# Patient Record
Sex: FEMALE | Race: WHITE | NOT HISPANIC OR LATINO | Employment: FULL TIME | URBAN - METROPOLITAN AREA
[De-identification: names, ages, dates, MRNs, and addresses within clinical notes are randomized per-mention and may not be internally consistent; named-entity substitution may affect disease eponyms.]

---

## 2018-07-06 ENCOUNTER — OFFICE VISIT (OUTPATIENT)
Dept: URGENT CARE | Facility: CLINIC | Age: 23
End: 2018-07-06
Payer: COMMERCIAL

## 2018-07-06 ENCOUNTER — HOSPITAL ENCOUNTER (OUTPATIENT)
Dept: RADIOLOGY | Facility: MEDICAL CENTER | Age: 23
End: 2018-07-06
Attending: FAMILY MEDICINE
Payer: COMMERCIAL

## 2018-07-06 VITALS
TEMPERATURE: 99.4 F | WEIGHT: 167 LBS | OXYGEN SATURATION: 100 % | SYSTOLIC BLOOD PRESSURE: 132 MMHG | RESPIRATION RATE: 16 BRPM | DIASTOLIC BLOOD PRESSURE: 62 MMHG | HEART RATE: 97 BPM | BODY MASS INDEX: 24.73 KG/M2 | HEIGHT: 69 IN

## 2018-07-06 DIAGNOSIS — R10.2 PELVIC PAIN IN PREGNANCY: ICD-10-CM

## 2018-07-06 DIAGNOSIS — O26.899 PELVIC PAIN IN PREGNANCY: ICD-10-CM

## 2018-07-06 LAB
INT CON NEG: ABNORMAL
INT CON POS: ABNORMAL
POC URINE PREGNANCY TEST: ABNORMAL

## 2018-07-06 PROCEDURE — 81025 URINE PREGNANCY TEST: CPT | Performed by: FAMILY MEDICINE

## 2018-07-06 PROCEDURE — 76817 TRANSVAGINAL US OBSTETRIC: CPT

## 2018-07-06 PROCEDURE — 99203 OFFICE O/P NEW LOW 30 MIN: CPT | Performed by: FAMILY MEDICINE

## 2018-07-08 ASSESSMENT — ENCOUNTER SYMPTOMS
FLANK PAIN: 0
NAUSEA: 1
WEIGHT LOSS: 0
FEVER: 0

## 2018-07-09 NOTE — PROGRESS NOTES
"Subjective:      Jenni Perez is a 22 y.o. female who presents with Abdominal Cramping (x2days pt is 8wks pregnant )            2 days waxing and waning pelvic cramping. At times severe. No bleeding/spotting. No urinary sx's. No fever. 8wk pregnant. No change in bowel. No dysuria. No other aggravating or alleviating factors.          Review of Systems   Constitutional: Negative for fever, malaise/fatigue and weight loss.   Gastrointestinal: Positive for nausea.   Genitourinary: Negative for flank pain and hematuria.   Skin: Negative for itching and rash.     .  Medications, Allergies, and current problem list reviewed today in Epic       Objective:     /62   Pulse 97   Temp 37.4 °C (99.4 °F)   Resp 16   Ht 1.753 m (5' 9\")   Wt 75.8 kg (167 lb)   LMP 05/11/2018   SpO2 100%   Breastfeeding? No   BMI 24.66 kg/m²      Physical Exam   Constitutional: She appears well-developed and well-nourished. No distress.   HENT:   Head: Normocephalic and atraumatic.   Eyes: Conjunctivae are normal.   Cardiovascular: Normal rate, regular rhythm and normal heart sounds.    Pulmonary/Chest: Effort normal and breath sounds normal. She has no wheezes.   Abdominal: Soft. There is no tenderness.   Genitourinary:   Genitourinary Comments: No suprapubic or CVAT   Skin: Skin is warm and dry. No rash noted.               Assessment/Plan:   US per radiology:  Probable early intrauterine gestation with a borderline intrauterine decidual sign detected in the anterior fundus. A mean diameter of 3 mm however should be present to definitively characterize. Therefore ectopic pregnancy still cannot be excluded and   clinical correlation is recommended    Small free pelvic fluid is identified in the right adnexa with normal appearance of bilateral ovaries and no abnormal adnexal mass detected    1. Pelvic pain in pregnancy  - US-OB PELVIS TRANSVAGINAL; Future  - POCT PREGNANCY    Low suspicion for ectopic pregnancy at this time. To ER " with persistent or worse pain o/w f/u with OB.

## 2018-10-12 ENCOUNTER — HOSPITAL ENCOUNTER (OUTPATIENT)
Facility: MEDICAL CENTER | Age: 23
End: 2018-10-12
Attending: PHYSICIAN ASSISTANT
Payer: COMMERCIAL

## 2018-10-12 ENCOUNTER — OFFICE VISIT (OUTPATIENT)
Dept: URGENT CARE | Facility: PHYSICIAN GROUP | Age: 23
End: 2018-10-12
Payer: COMMERCIAL

## 2018-10-12 VITALS
HEART RATE: 72 BPM | OXYGEN SATURATION: 100 % | RESPIRATION RATE: 14 BRPM | TEMPERATURE: 98.5 F | DIASTOLIC BLOOD PRESSURE: 72 MMHG | SYSTOLIC BLOOD PRESSURE: 120 MMHG

## 2018-10-12 DIAGNOSIS — N30.01 ACUTE CYSTITIS WITH HEMATURIA: ICD-10-CM

## 2018-10-12 LAB
APPEARANCE UR: NORMAL
BILIRUB UR STRIP-MCNC: NORMAL MG/DL
COLOR UR AUTO: YELLOW
GLUCOSE UR STRIP.AUTO-MCNC: NORMAL MG/DL
KETONES UR STRIP.AUTO-MCNC: NORMAL MG/DL
LEUKOCYTE ESTERASE UR QL STRIP.AUTO: NORMAL
NITRITE UR QL STRIP.AUTO: NORMAL
PH UR STRIP.AUTO: 6.5 [PH] (ref 5–8)
PROT UR QL STRIP: NORMAL MG/DL
RBC UR QL AUTO: NORMAL
SP GR UR STRIP.AUTO: 1.02
UROBILINOGEN UR STRIP-MCNC: 0.2 MG/DL

## 2018-10-12 PROCEDURE — 87086 URINE CULTURE/COLONY COUNT: CPT

## 2018-10-12 PROCEDURE — 81002 URINALYSIS NONAUTO W/O SCOPE: CPT | Performed by: PHYSICIAN ASSISTANT

## 2018-10-12 PROCEDURE — 99214 OFFICE O/P EST MOD 30 MIN: CPT | Performed by: PHYSICIAN ASSISTANT

## 2018-10-12 RX ORDER — AMOXICILLIN AND CLAVULANATE POTASSIUM 875; 125 MG/1; MG/1
1 TABLET, FILM COATED ORAL 2 TIMES DAILY
Qty: 10 TAB | Refills: 0 | Status: SHIPPED | OUTPATIENT
Start: 2018-10-12 | End: 2018-10-17

## 2018-10-13 DIAGNOSIS — N30.01 ACUTE CYSTITIS WITH HEMATURIA: ICD-10-CM

## 2018-10-13 NOTE — PROGRESS NOTES
Chief Complaint   Patient presents with   • Abdominal Pain     poss round ligemint pain. 18 wks pregnant.       HISTORY OF PRESENT ILLNESS: Patient is a 23 y.o. female who presents today for about 2 days of bilateral however more often left abdominal discomfort that can radiate up abdomen and down her leg.  She felt that it was possible round ligament pain but wanted to make sure.  She states that she has had slight nausea today but no vomiting.  She has not had flank pain, fevers or chills.  She denies hematuria or spotting.  She has had slight increased frequency and urgency today but no dysuria.  No increase or change in vaginal discharge.   No attempted interventions.   Pregnancy is her first and has been without complication.  Next appt is Monday.     There are no active problems to display for this patient.      Allergies:Patient has no known allergies.    Current Outpatient Prescriptions Ordered in [facility]   Medication Sig Dispense Refill   • amoxicillin-clavulanate (AUGMENTIN) 875-125 MG Tab Take 1 Tab by mouth 2 times a day for 5 days. 10 Tab 0   • QUETIAPINE FUMARATE PO Take  by mouth.       No current Epic-ordered facility-administered medications on file.        No past medical history on file.    Social History   Substance Use Topics   • Smoking status: Never Smoker   • Smokeless tobacco: Never Used   • Alcohol use No       No family status information on file.   No family history on file.    ROS:  Review of Systems   Constitutional: SEE HPI  HENT: Negative for ear pain, nosebleeds, congestion, sore throat and neck pain.    Eyes: Negative for blurred vision.   Respiratory: Negative for cough, sputum production, shortness of breath and wheezing.    Cardiovascular: Negative for chest pain, palpitations, orthopnea and leg swelling.   Gastrointestinal: SEE HPI  Genitourinary: SEE HPI    Exam:  Blood pressure 120/72, pulse 72, temperature 36.9 °C (98.5 °F), temperature source Temporal, resp. rate 14, last  menstrual period 05/11/2018, SpO2 100 %, not currently breastfeeding.  General:  Well nourished, well developed female in NAD  Eyes: PERRLA, EOM within normal limits, no conjunctival injection, no scleral icterus, visual fields and acuity grossly intact.  Mouth: reasonable hygiene, no erythema exudates or tonsillar enlargement.  Moist mucus membranes.   Neck: no masses, range of motion within normal limits, no tracheal deviation. No lymphadenopathy  Pulmonary: Normal respiratory effort, no wheezes, crackles, or rhonchi.  Cardiovascular: regular rate and rhythm without murmurs, rubs, or gallops.  Abdomen: Soft, nontender, nondistended. Normal bowel sounds. No hepatosplenomegaly or masses, or hernias. No rebound or guarding.  No CVA tenderness.  Fundus approx 2 inches below umbilicus.   Skin: No visible rashes or lesion. Warm, pink, dry.   Extremities: no clubbing, cyanosis, or edema.  Neuro: A&O x 3. Speech normal/clear.  Normal gait.     Component Results     Component Value Ref Range & Units Status   POC Color yellow  Negative Final   POC Appearance cloudy  Negative Final   POC Leukocyte Esterase mod  Negative Final   POC Nitrites neg  Negative Final   POC Urobiligen 0.2  Negative (0.2) mg/dL Final   POC Protein neg  Negative mg/dL Final   POC Urine PH 6.5  5.0 - 8.0 Final   POC Blood tr  Negative Final   POC Specific Gravity 1.020  <1.005 - >1.030 Final   POC Ketones neg  Negative mg/dL Final   POC Bilirubin neg  Negative mg/dL Final   POC Glucose neg  Negative mg/dL Final       Assessment/Plan:  1. Acute cystitis with hematuria  POCT Urinalysis    URINE CULTURE(NEW)    amoxicillin-clavulanate (AUGMENTIN) 875-125 MG Tab       -POCT U/A as above.  Culture sent  -patient with above findings, nausea/urinary frequency increase from baseline, will treat and follow with culture.   Vitals stable, no clinical concern for pyelonephritis at this time.   -Fluids emphasized.  Void before/after intercourse.  Recommend abstain  until treatment complete/symptoms resolved.   -signs and symptoms of worsening/ascending infection discussed and to seek prompt medical care should they arise.   -OB follow as sched on Monday      Supportive care, differential diagnoses, and indications for immediate follow-up discussed with patient.   Pathogenesis of diagnosis discussed including typical length and natural progression.   Instructed to return to clinic or nearest emergency department for any change in condition, further concerns, or worsening of symptoms.  Patient states understanding of the plan of care and discharge instructions.        Asha Maravilla P.A.-C.

## 2018-10-15 LAB
BACTERIA UR CULT: ABNORMAL
BACTERIA UR CULT: ABNORMAL
SIGNIFICANT IND 70042: ABNORMAL
SITE SITE: ABNORMAL
SOURCE SOURCE: ABNORMAL

## 2018-12-16 ENCOUNTER — APPOINTMENT (OUTPATIENT)
Dept: OTHER | Facility: IMAGING CENTER | Age: 23
End: 2018-12-16

## 2019-01-21 ENCOUNTER — APPOINTMENT (OUTPATIENT)
Dept: OTHER | Facility: IMAGING CENTER | Age: 24
End: 2019-01-21

## 2019-03-14 ENCOUNTER — APPOINTMENT (OUTPATIENT)
Dept: OBGYN | Facility: MEDICAL CENTER | Age: 24
End: 2019-03-14
Attending: OBSTETRICS & GYNECOLOGY
Payer: COMMERCIAL

## 2019-03-14 ENCOUNTER — ANESTHESIA (OUTPATIENT)
Dept: OBGYN | Facility: MEDICAL CENTER | Age: 24
End: 2019-03-14
Payer: COMMERCIAL

## 2019-03-14 ENCOUNTER — HOSPITAL ENCOUNTER (INPATIENT)
Facility: MEDICAL CENTER | Age: 24
LOS: 2 days | End: 2019-03-16
Attending: OBSTETRICS & GYNECOLOGY | Admitting: OBSTETRICS & GYNECOLOGY
Payer: COMMERCIAL

## 2019-03-14 ENCOUNTER — ANESTHESIA EVENT (OUTPATIENT)
Dept: OBGYN | Facility: MEDICAL CENTER | Age: 24
End: 2019-03-14
Payer: COMMERCIAL

## 2019-03-14 LAB
BASOPHILS # BLD AUTO: 0.4 % (ref 0–1.8)
BASOPHILS # BLD: 0.05 K/UL (ref 0–0.12)
EOSINOPHIL # BLD AUTO: 0.16 K/UL (ref 0–0.51)
EOSINOPHIL NFR BLD: 1.4 % (ref 0–6.9)
ERYTHROCYTE [DISTWIDTH] IN BLOOD BY AUTOMATED COUNT: 42.7 FL (ref 35.9–50)
HCT VFR BLD AUTO: 42.2 % (ref 37–47)
HGB BLD-MCNC: 13.9 G/DL (ref 12–16)
HOLDING TUBE BB 8507: NORMAL
IMM GRANULOCYTES # BLD AUTO: 0.1 K/UL (ref 0–0.11)
IMM GRANULOCYTES NFR BLD AUTO: 0.9 % (ref 0–0.9)
LYMPHOCYTES # BLD AUTO: 2.82 K/UL (ref 1–4.8)
LYMPHOCYTES NFR BLD: 24.4 % (ref 22–41)
MCH RBC QN AUTO: 30.2 PG (ref 27–33)
MCHC RBC AUTO-ENTMCNC: 32.9 G/DL (ref 33.6–35)
MCV RBC AUTO: 91.5 FL (ref 81.4–97.8)
MONOCYTES # BLD AUTO: 1.22 K/UL (ref 0–0.85)
MONOCYTES NFR BLD AUTO: 10.5 % (ref 0–13.4)
NEUTROPHILS # BLD AUTO: 7.23 K/UL (ref 2–7.15)
NEUTROPHILS NFR BLD: 62.4 % (ref 44–72)
NRBC # BLD AUTO: 0 K/UL
NRBC BLD-RTO: 0 /100 WBC
PLATELET # BLD AUTO: 230 K/UL (ref 164–446)
PMV BLD AUTO: 11.7 FL (ref 9–12.9)
RBC # BLD AUTO: 4.61 M/UL (ref 4.2–5.4)
WBC # BLD AUTO: 11.6 K/UL (ref 4.8–10.8)

## 2019-03-14 PROCEDURE — A9270 NON-COVERED ITEM OR SERVICE: HCPCS | Performed by: OBSTETRICS & GYNECOLOGY

## 2019-03-14 PROCEDURE — 3E0E37Z INTRODUCTION OF ELECTROLYTIC AND WATER BALANCE SUBSTANCE INTO PRODUCTS OF CONCEPTION, PERCUTANEOUS APPROACH: ICD-10-PCS | Performed by: OBSTETRICS & GYNECOLOGY

## 2019-03-14 PROCEDURE — 3E0P7VZ INTRODUCTION OF HORMONE INTO FEMALE REPRODUCTIVE, VIA NATURAL OR ARTIFICIAL OPENING: ICD-10-PCS | Performed by: OBSTETRICS & GYNECOLOGY

## 2019-03-14 PROCEDURE — 700111 HCHG RX REV CODE 636 W/ 250 OVERRIDE (IP): Performed by: OBSTETRICS & GYNECOLOGY

## 2019-03-14 PROCEDURE — 700105 HCHG RX REV CODE 258: Performed by: OBSTETRICS & GYNECOLOGY

## 2019-03-14 PROCEDURE — 302130 PCEA EPIDURAL PUMP: Performed by: OBSTETRICS & GYNECOLOGY

## 2019-03-14 PROCEDURE — 700111 HCHG RX REV CODE 636 W/ 250 OVERRIDE (IP)

## 2019-03-14 PROCEDURE — 770002 HCHG ROOM/CARE - OB PRIVATE (112)

## 2019-03-14 PROCEDURE — 700102 HCHG RX REV CODE 250 W/ 637 OVERRIDE(OP): Performed by: OBSTETRICS & GYNECOLOGY

## 2019-03-14 PROCEDURE — 10907ZC DRAINAGE OF AMNIOTIC FLUID, THERAPEUTIC FROM PRODUCTS OF CONCEPTION, VIA NATURAL OR ARTIFICIAL OPENING: ICD-10-PCS | Performed by: OBSTETRICS & GYNECOLOGY

## 2019-03-14 PROCEDURE — 700101 HCHG RX REV CODE 250: Performed by: ANESTHESIOLOGY

## 2019-03-14 PROCEDURE — 59409 OBSTETRICAL CARE: CPT

## 2019-03-14 PROCEDURE — 0UQGXZZ REPAIR VAGINA, EXTERNAL APPROACH: ICD-10-PCS | Performed by: OBSTETRICS & GYNECOLOGY

## 2019-03-14 PROCEDURE — 85025 COMPLETE CBC W/AUTO DIFF WBC: CPT

## 2019-03-14 PROCEDURE — 304965 HCHG RECOVERY SERVICES

## 2019-03-14 PROCEDURE — 36415 COLL VENOUS BLD VENIPUNCTURE: CPT

## 2019-03-14 RX ORDER — ROPIVACAINE HYDROCHLORIDE 2 MG/ML
INJECTION, SOLUTION EPIDURAL; INFILTRATION; PERINEURAL
Status: COMPLETED
Start: 2019-03-14 | End: 2019-03-14

## 2019-03-14 RX ORDER — SODIUM CHLORIDE, SODIUM LACTATE, POTASSIUM CHLORIDE, AND CALCIUM CHLORIDE .6; .31; .03; .02 G/100ML; G/100ML; G/100ML; G/100ML
250 INJECTION, SOLUTION INTRAVENOUS PRN
Status: DISCONTINUED | OUTPATIENT
Start: 2019-03-14 | End: 2019-03-15 | Stop reason: HOSPADM

## 2019-03-14 RX ORDER — ONDANSETRON 4 MG/1
4 TABLET, ORALLY DISINTEGRATING ORAL EVERY 6 HOURS PRN
Status: DISCONTINUED | OUTPATIENT
Start: 2019-03-14 | End: 2019-03-16 | Stop reason: HOSPADM

## 2019-03-14 RX ORDER — ALUMINA, MAGNESIA, AND SIMETHICONE 2400; 2400; 240 MG/30ML; MG/30ML; MG/30ML
30 SUSPENSION ORAL EVERY 6 HOURS PRN
Status: DISCONTINUED | OUTPATIENT
Start: 2019-03-14 | End: 2019-03-15 | Stop reason: HOSPADM

## 2019-03-14 RX ORDER — LIDOCAINE HYDROCHLORIDE AND EPINEPHRINE 15; 5 MG/ML; UG/ML
INJECTION, SOLUTION EPIDURAL PRN
Status: DISCONTINUED | OUTPATIENT
Start: 2019-03-14 | End: 2019-03-14 | Stop reason: SURG

## 2019-03-14 RX ORDER — SODIUM CHLORIDE, SODIUM LACTATE, POTASSIUM CHLORIDE, CALCIUM CHLORIDE 600; 310; 30; 20 MG/100ML; MG/100ML; MG/100ML; MG/100ML
300 INJECTION, SOLUTION INTRAVENOUS CONTINUOUS
Status: DISCONTINUED | OUTPATIENT
Start: 2019-03-14 | End: 2019-03-16 | Stop reason: HOSPADM

## 2019-03-14 RX ORDER — QUETIAPINE FUMARATE 25 MG/1
50 TABLET, FILM COATED ORAL EVERY EVENING
Status: DISCONTINUED | OUTPATIENT
Start: 2019-03-14 | End: 2019-03-16 | Stop reason: HOSPADM

## 2019-03-14 RX ORDER — LIDOCAINE HYDROCHLORIDE 10 MG/ML
INJECTION, SOLUTION INFILTRATION; PERINEURAL
Status: COMPLETED
Start: 2019-03-14 | End: 2019-03-14

## 2019-03-14 RX ORDER — MISOPROSTOL 200 UG/1
600 TABLET ORAL
Status: DISCONTINUED | OUTPATIENT
Start: 2019-03-14 | End: 2019-03-16 | Stop reason: HOSPADM

## 2019-03-14 RX ORDER — MISOPROSTOL 200 UG/1
1000 TABLET ORAL
Status: DISCONTINUED | OUTPATIENT
Start: 2019-03-14 | End: 2019-03-15 | Stop reason: HOSPADM

## 2019-03-14 RX ORDER — METHYLERGONOVINE MALEATE 0.2 MG/ML
INJECTION INTRAVENOUS
Status: COMPLETED
Start: 2019-03-14 | End: 2019-03-14

## 2019-03-14 RX ORDER — ONDANSETRON 2 MG/ML
4 INJECTION INTRAMUSCULAR; INTRAVENOUS EVERY 6 HOURS PRN
Status: DISCONTINUED | OUTPATIENT
Start: 2019-03-14 | End: 2019-03-16 | Stop reason: HOSPADM

## 2019-03-14 RX ORDER — SODIUM CHLORIDE, SODIUM LACTATE, POTASSIUM CHLORIDE, CALCIUM CHLORIDE 600; 310; 30; 20 MG/100ML; MG/100ML; MG/100ML; MG/100ML
INJECTION, SOLUTION INTRAVENOUS PRN
Status: DISCONTINUED | OUTPATIENT
Start: 2019-03-14 | End: 2019-03-16 | Stop reason: HOSPADM

## 2019-03-14 RX ORDER — SODIUM CHLORIDE, SODIUM LACTATE, POTASSIUM CHLORIDE, CALCIUM CHLORIDE 600; 310; 30; 20 MG/100ML; MG/100ML; MG/100ML; MG/100ML
INJECTION, SOLUTION INTRAVENOUS CONTINUOUS
Status: DISPENSED | OUTPATIENT
Start: 2019-03-14 | End: 2019-03-14

## 2019-03-14 RX ORDER — DOCUSATE SODIUM 100 MG/1
100 CAPSULE, LIQUID FILLED ORAL 2 TIMES DAILY PRN
Status: DISCONTINUED | OUTPATIENT
Start: 2019-03-14 | End: 2019-03-16 | Stop reason: HOSPADM

## 2019-03-14 RX ORDER — DEXTROSE, SODIUM CHLORIDE, SODIUM LACTATE, POTASSIUM CHLORIDE, AND CALCIUM CHLORIDE 5; .6; .31; .03; .02 G/100ML; G/100ML; G/100ML; G/100ML; G/100ML
INJECTION, SOLUTION INTRAVENOUS CONTINUOUS
Status: DISCONTINUED | OUTPATIENT
Start: 2019-03-14 | End: 2019-03-16 | Stop reason: HOSPADM

## 2019-03-14 RX ORDER — ROPIVACAINE HYDROCHLORIDE 2 MG/ML
INJECTION, SOLUTION EPIDURAL; INFILTRATION; PERINEURAL CONTINUOUS
Status: DISCONTINUED | OUTPATIENT
Start: 2019-03-15 | End: 2019-03-16 | Stop reason: HOSPADM

## 2019-03-14 RX ORDER — SODIUM CHLORIDE, SODIUM LACTATE, POTASSIUM CHLORIDE, AND CALCIUM CHLORIDE .6; .31; .03; .02 G/100ML; G/100ML; G/100ML; G/100ML
1000 INJECTION, SOLUTION INTRAVENOUS
Status: DISCONTINUED | OUTPATIENT
Start: 2019-03-14 | End: 2019-03-15 | Stop reason: HOSPADM

## 2019-03-14 RX ORDER — OXYCODONE HYDROCHLORIDE AND ACETAMINOPHEN 5; 325 MG/1; MG/1
1 TABLET ORAL EVERY 4 HOURS PRN
Status: DISCONTINUED | OUTPATIENT
Start: 2019-03-14 | End: 2019-03-16 | Stop reason: HOSPADM

## 2019-03-14 RX ORDER — CARBOPROST TROMETHAMINE 250 UG/ML
250 INJECTION, SOLUTION INTRAMUSCULAR
Status: DISCONTINUED | OUTPATIENT
Start: 2019-03-14 | End: 2019-03-16 | Stop reason: HOSPADM

## 2019-03-14 RX ORDER — IBUPROFEN 800 MG/1
800 TABLET ORAL EVERY 8 HOURS PRN
Status: DISCONTINUED | OUTPATIENT
Start: 2019-03-14 | End: 2019-03-15

## 2019-03-14 RX ORDER — OXYCODONE HYDROCHLORIDE AND ACETAMINOPHEN 5; 325 MG/1; MG/1
2 TABLET ORAL EVERY 4 HOURS PRN
Status: DISCONTINUED | OUTPATIENT
Start: 2019-03-14 | End: 2019-03-16 | Stop reason: HOSPADM

## 2019-03-14 RX ORDER — MISOPROSTOL 200 UG/1
TABLET ORAL
Status: COMPLETED
Start: 2019-03-14 | End: 2019-03-14

## 2019-03-14 RX ORDER — BISACODYL 10 MG
10 SUPPOSITORY, RECTAL RECTAL PRN
Status: DISCONTINUED | OUTPATIENT
Start: 2019-03-14 | End: 2019-03-16 | Stop reason: HOSPADM

## 2019-03-14 RX ORDER — VITAMIN A ACETATE, BETA CAROTENE, ASCORBIC ACID, CHOLECALCIFEROL, .ALPHA.-TOCOPHEROL ACETATE, DL-, THIAMINE MONONITRATE, RIBOFLAVIN, NIACINAMIDE, PYRIDOXINE HYDROCHLORIDE, FOLIC ACID, CYANOCOBALAMIN, CALCIUM CARBONATE, FERROUS FUMARATE, ZINC OXIDE, CUPRIC OXIDE 3080; 12; 120; 400; 1; 1.84; 3; 20; 22; 920; 25; 200; 27; 10; 2 [IU]/1; UG/1; MG/1; [IU]/1; MG/1; MG/1; MG/1; MG/1; MG/1; [IU]/1; MG/1; MG/1; MG/1; MG/1; MG/1
1 TABLET, FILM COATED ORAL EVERY MORNING
Status: DISCONTINUED | OUTPATIENT
Start: 2019-03-15 | End: 2019-03-16 | Stop reason: HOSPADM

## 2019-03-14 RX ORDER — METHYLERGONOVINE MALEATE 0.2 MG/ML
0.2 INJECTION INTRAVENOUS
Status: DISCONTINUED | OUTPATIENT
Start: 2019-03-14 | End: 2019-03-16 | Stop reason: HOSPADM

## 2019-03-14 RX ADMIN — OXYTOCIN 2000 ML/HR: 10 INJECTION, SOLUTION INTRAMUSCULAR; INTRAVENOUS at 22:18

## 2019-03-14 RX ADMIN — SODIUM CHLORIDE, POTASSIUM CHLORIDE, SODIUM LACTATE AND CALCIUM CHLORIDE: 600; 310; 30; 20 INJECTION, SOLUTION INTRAVENOUS at 12:12

## 2019-03-14 RX ADMIN — SODIUM CHLORIDE, POTASSIUM CHLORIDE, SODIUM LACTATE AND CALCIUM CHLORIDE 300 ML: 600; 310; 30; 20 INJECTION, SOLUTION INTRAVENOUS at 18:36

## 2019-03-14 RX ADMIN — Medication 2 MILLI-UNITS/MIN: at 12:15

## 2019-03-14 RX ADMIN — ROPIVACAINE HYDROCHLORIDE 100 ML: 2 INJECTION, SOLUTION EPIDURAL; INFILTRATION; PERINEURAL at 13:33

## 2019-03-14 RX ADMIN — Medication 125 ML/HR: at 23:59

## 2019-03-14 RX ADMIN — ROPIVACAINE HYDROCHLORIDE 100 ML: 2 INJECTION, SOLUTION EPIDURAL; INFILTRATION at 13:33

## 2019-03-14 RX ADMIN — MISOPROSTOL 25 MCG: 100 TABLET ORAL at 07:16

## 2019-03-14 RX ADMIN — LIDOCAINE HYDROCHLORIDE,EPINEPHRINE BITARTRATE 3 ML: 15; .005 INJECTION, SOLUTION EPIDURAL; INFILTRATION; INTRACAUDAL; PERINEURAL at 14:21

## 2019-03-14 RX ADMIN — SODIUM CHLORIDE, POTASSIUM CHLORIDE, SODIUM LACTATE AND CALCIUM CHLORIDE: 600; 310; 30; 20 INJECTION, SOLUTION INTRAVENOUS at 13:24

## 2019-03-14 ASSESSMENT — LIFESTYLE VARIABLES
ALCOHOL_USE: NO
EVER_SMOKED: NEVER

## 2019-03-14 ASSESSMENT — PATIENT HEALTH QUESTIONNAIRE - PHQ9
2. FEELING DOWN, DEPRESSED, IRRITABLE, OR HOPELESS: NOT AT ALL
SUM OF ALL RESPONSES TO PHQ9 QUESTIONS 1 AND 2: 0
1. LITTLE INTEREST OR PLEASURE IN DOING THINGS: NOT AT ALL
1. LITTLE INTEREST OR PLEASURE IN DOING THINGS: NOT AT ALL
2. FEELING DOWN, DEPRESSED, IRRITABLE, OR HOPELESS: NOT AT ALL
2. FEELING DOWN, DEPRESSED, IRRITABLE, OR HOPELESS: NOT AT ALL
SUM OF ALL RESPONSES TO PHQ9 QUESTIONS 1 AND 2: 0
SUM OF ALL RESPONSES TO PHQ9 QUESTIONS 1 AND 2: 0
1. LITTLE INTEREST OR PLEASURE IN DOING THINGS: NOT AT ALL

## 2019-03-14 NOTE — PROGRESS NOTES
"Labor Progress Note    Jenni Perez   IUP at 40.2/IOL      Subjective:  Pt hurting with contractions and rates them at 6/10.  Uterine contractions:yes  Pain: Yes. Severity: moderate    Objective:   Vitals:    19 0529 19 0530   BP:  121/71   Pulse:  76   Temp: 36.4 °C (97.6 °F)    TempSrc: Temporal    Weight: 90.7 kg (200 lb)    Height: 1.753 m (5' 9\")      Fetal heart variability: 140's category 1  [employer]: q 1-2  SVE: 370/-1 arom, clear, iupc placed  Membranes ruptured: .yes    Meds:   Epidural : .no  Pitocin: .yes, 4 mu    Labs:  Recent Results (from the past 24 hour(s))   CBC WITH DIFFERENTIAL    Collection Time: 19  5:57 AM   Result Value Ref Range    WBC 11.6 (H) 4.8 - 10.8 K/uL    RBC 4.61 4.20 - 5.40 M/uL    Hemoglobin 13.9 12.0 - 16.0 g/dL    Hematocrit 42.2 37.0 - 47.0 %    MCV 91.5 81.4 - 97.8 fL    MCH 30.2 27.0 - 33.0 pg    MCHC 32.9 (L) 33.6 - 35.0 g/dL    RDW 42.7 35.9 - 50.0 fL    Platelet Count 230 164 - 446 K/uL    MPV 11.7 9.0 - 12.9 fL    Neutrophils-Polys 62.40 44.00 - 72.00 %    Lymphocytes 24.40 22.00 - 41.00 %    Monocytes 10.50 0.00 - 13.40 %    Eosinophils 1.40 0.00 - 6.90 %    Basophils 0.40 0.00 - 1.80 %    Immature Granulocytes 0.90 0.00 - 0.90 %    Nucleated RBC 0.00 /100 WBC    Neutrophils (Absolute) 7.23 (H) 2.00 - 7.15 K/uL    Lymphs (Absolute) 2.82 1.00 - 4.80 K/uL    Monos (Absolute) 1.22 (H) 0.00 - 0.85 K/uL    Eos (Absolute) 0.16 0.00 - 0.51 K/uL    Baso (Absolute) 0.05 0.00 - 0.12 K/uL    Immature Granulocytes (abs) 0.10 0.00 - 0.11 K/uL    NRBC (Absolute) 0.00 K/uL   HOLD BLOOD BANK SPECIMEN (NOT TESTED)    Collection Time: 19  5:57 AM   Result Value Ref Range    Holding Tube - Bb DONE        Assessment:   IUP at 40.2/elective IOL: pt s/p cytotec 25 mcg per vagina and now on pitocin. Expt .  GBBS: negative  Fetal status: reassuring.   Kirti Santana"

## 2019-03-14 NOTE — H&P
DATE OF ADMISSION:  2019    CHIEF COMPLAINT:  Intrauterine pregnancy at 40 weeks and 2 days and elective   induction of labor.    HISTORY OF PRESENT ILLNESS:  This patient is a 23-year-old  1, para 0   with last menstrual period of 2018 at 40 weeks and 2 days with a due   date of 2019 based on a 7-week ultrasound.  Patient has had prenatal   care with me and it has been uncomplicated.  She does have a history of   depression and migraine headaches, but has had no issues with depression   during this pregnancy.  The patient has been having good fetal movement, no   contractions, no leaking of fluid.  Her cervix has been 1-2 cm dilated and at   this time, patient desired elective induction of labor.  Risks, benefits,   indications, and alternatives were discussed with her.  When she arrived to   labor and delivery, she was having rare contractions.  Her cervix was still   1-2, 50% effaced and -3 station and therefore, patient had 1 Cytotec 25 mcg   placed per vagina.    PAST MEDICAL HISTORY:  1.  ADHD.  2.  History of depression.  3.  Migraines.    PAST SURGICAL HISTORY:  None.    MEDICATIONS:  1.  Prenatal vitamins.  2.  Seroquel 50 mg 1 p.o. daily.  3.  Esgic 1 tablet q. 4-6 hours p.r.n. for pain.    ALLERGIES:  No known drug allergies.    OBSTETRICAL HISTORY:  She is a  1, para 0.    GYNECOLOGIC HISTORY:  Patient started menstruating at age 12.  Has menstrual   cycle every 28 days, last about 5 days.  No history of STDs.  No history of   HSV.    SOCIAL HISTORY:  Patient denies tobacco, alcohol or drug use.    PHYSICAL EXAMINATION:  VITAL SIGNS:  Stable.  She is afebrile.  GENERAL:  Pleasant female in no acute distress.  LUNGS:  Clear to auscultation bilaterally.  CARDIOVASCULAR:  Regular rate and rhythm.  No murmur.  ABDOMEN:  Soft, gravid.  Leopold's to 7-1/2 pounds.  EXTREMITIES:  No calf tenderness.  GENITOURINARY:  Fetal heart tones 130s, category 1.  Davy joshua    irregularly.  Sterile vaginal exam, again 1-2 cm dilated, 50% effaced, -3   station, status post Cytotec 25 mcg per vagina.    Patient's prenatal labs, chlamydia and gonorrhea negative.  GBS is negative.    H and H at 28 weeks 13.9 and 42.4, platelets are 249.  One-hour glucose 48.    MSAFP is negative, negative Down syndrome, negative trisomy 18 based on first   trimester screen with neural tube.  The patient is rubella immune, hepatitis B   surface antigen negative.  She is O positive, antibody screen negative, RPR   nonreactive, HIV negative.    ASSESSMENT AND PLAN:  A 22-year-old  1, para 0 at 40 weeks and 2 days   with a due date of 2019 based on a 7-week ultrasound.  1.  Elective induction of labor.  Patient is past her due date and she desires   elective induction of labor.  Cytotec 25 mcg per vagina has been started.  We   will expect a normal spontaneous vaginal delivery.  2.  Group B Streptococcus is negative.  3.  Depression, stable.       ____________________________________     RIP LAINEZ MD    [facility]    DD:  2019 08:20:48  DT:  2019 11:07:26    D#:  [id number]  Job#:  [id number]

## 2019-03-14 NOTE — PROGRESS NOTES
0700 Report received from Maureen BRAGG, POC discussed    0715 RN at bedside, cytotec placed, pt educated to remain supine for an hour and to call out if she needs any help. Pt verbalized understanding.     1125 Dr. Santana updated on pt status, orders for pitocin received.     1155 RN at bedside to start pitocin, pt eating. Pt informed to let RN know as soon as she was done so the pitocin can be started.     1305 RN at bedside, pt requesting epidural. Consents signed and bolus started.    1400 Report given to Nicky BRAGG, POC discussed

## 2019-03-14 NOTE — PROGRESS NOTES
0527- Pt reports to unit for IOL for postdates. BROCK 3/12/2019. 40w2d.     0618- Dr Santana called and made aware of pt arrival and status. Orders to place cytotec.     0700- Report given to MAHSA Aden.

## 2019-03-14 NOTE — PROGRESS NOTES
1400 Received report from NOC RN, assumed care, POC discussed, all questions answered. Pt resting comfortably in bed with epidural, denies needs at this time, VSS, encouraged to call RN for any needs, wants, desires or concerns.   1700  Rn at bedside, SVE as noted, pt tolerated well, pt repositioned to right side with peanutball.  1727 Dr. Santana phoned and RN left message, will await return phone call.  1731 Dr. Santana phoned RN, orders received to turn pitocin off, will re-evaluate when she arrived to unit in an hour or so, strip explained verbally to MD, she will review strip remotely. Will continue with POC.  1822 Dr Santana in department, strip reviewed, orders for amnioinfusion-300mL bolus wit 100mL/hr thereafter. Will keep pitocin off at this time and this RN to check SVE at 1900. Will continue with POC.  1836 amnioinfusion initiated-see MAR.   1900 bedside report given to NOC RN, assumed care, POC discussed, all questions answered. VSS. SVE as noted.

## 2019-03-14 NOTE — ANESTHESIA PREPROCEDURE EVALUATION
Relevant Problems   No relevant active problems   pregnancy  labor    Physical Exam    Airway   Mallampati: II  TM distance: >3 FB  Neck ROM: full       Cardiovascular - normal exam  Rhythm: regular  Rate: normal  (-) murmur     Dental - normal exam         Pulmonary - normal exam  Breath sounds clear to auscultation     Abdominal    Neurological - normal exam                 Anesthesia Plan    ASA 2       Plan - epidural                       Informed Consent:    Anesthetic plan and risks discussed with patient and spouse.

## 2019-03-14 NOTE — ANESTHESIA PROCEDURE NOTES
Epidural Block  Performed by: SP ESCOBEDO  Authorized by: SP ECSOBEDO     Patient Location:  OB  Start Time:  3/14/2019 2:17 PM  Reason for Block: labor analgesia    patient identified, IV checked, site marked, risks and benefits discussed, surgical consent, monitors and equipment checked, pre-op evaluation and timeout performed    Patient Position:  Sitting  Prep: Betadine, patient draped and sterile technique    Monitoring:  Blood pressure, continuous pulse oximetry and heart rate  Approach:  Midline  Location:  L2-L3  Injection Technique:  ALETA air  Needle Type:  Tuohy  Needle Gauge:  17 G  Needle Length:  3.5 in  Loss of resistance::  6  Catheter Size:  19 G  Catheter at Skin Depth:  10  Test Dose:  Lidocaine 1.5% with epinephrine 1-to-200,000  Test Dose Result:  Negative   1st pass.  25 G pencil point to CSF.  2ml of 0.1%  ropivicaine

## 2019-03-15 LAB
ERYTHROCYTE [DISTWIDTH] IN BLOOD BY AUTOMATED COUNT: 41.9 FL (ref 35.9–50)
HCT VFR BLD AUTO: 38.8 % (ref 37–47)
HGB BLD-MCNC: 12.9 G/DL (ref 12–16)
MCH RBC QN AUTO: 30.2 PG (ref 27–33)
MCHC RBC AUTO-ENTMCNC: 33.2 G/DL (ref 33.6–35)
MCV RBC AUTO: 90.9 FL (ref 81.4–97.8)
PLATELET # BLD AUTO: 194 K/UL (ref 164–446)
PMV BLD AUTO: 11.7 FL (ref 9–12.9)
RBC # BLD AUTO: 4.27 M/UL (ref 4.2–5.4)
WBC # BLD AUTO: 19.3 K/UL (ref 4.8–10.8)

## 2019-03-15 PROCEDURE — 700102 HCHG RX REV CODE 250 W/ 637 OVERRIDE(OP): Performed by: OBSTETRICS & GYNECOLOGY

## 2019-03-15 PROCEDURE — 36415 COLL VENOUS BLD VENIPUNCTURE: CPT

## 2019-03-15 PROCEDURE — 770002 HCHG ROOM/CARE - OB PRIVATE (112)

## 2019-03-15 PROCEDURE — 85027 COMPLETE CBC AUTOMATED: CPT

## 2019-03-15 PROCEDURE — 700112 HCHG RX REV CODE 229: Performed by: OBSTETRICS & GYNECOLOGY

## 2019-03-15 PROCEDURE — A9270 NON-COVERED ITEM OR SERVICE: HCPCS | Performed by: OBSTETRICS & GYNECOLOGY

## 2019-03-15 RX ORDER — DOCUSATE SODIUM 100 MG/1
100 CAPSULE, LIQUID FILLED ORAL 2 TIMES DAILY
Status: DISCONTINUED | OUTPATIENT
Start: 2019-03-15 | End: 2019-03-16 | Stop reason: HOSPADM

## 2019-03-15 RX ORDER — OXYCODONE HYDROCHLORIDE AND ACETAMINOPHEN 5; 325 MG/1; MG/1
1 TABLET ORAL EVERY 4 HOURS PRN
Status: DISCONTINUED | OUTPATIENT
Start: 2019-03-15 | End: 2019-03-16 | Stop reason: HOSPADM

## 2019-03-15 RX ORDER — IBUPROFEN 600 MG/1
600 TABLET ORAL EVERY 6 HOURS PRN
Status: DISCONTINUED | OUTPATIENT
Start: 2019-03-15 | End: 2019-03-16 | Stop reason: HOSPADM

## 2019-03-15 RX ADMIN — IBUPROFEN 600 MG: 600 TABLET ORAL at 23:20

## 2019-03-15 RX ADMIN — DOCUSATE SODIUM 100 MG: 100 CAPSULE, LIQUID FILLED ORAL at 06:02

## 2019-03-15 RX ADMIN — IBUPROFEN 600 MG: 600 TABLET ORAL at 10:58

## 2019-03-15 RX ADMIN — OXYCODONE HYDROCHLORIDE AND ACETAMINOPHEN 1 TABLET: 5; 325 TABLET ORAL at 06:00

## 2019-03-15 RX ADMIN — Medication 1 TABLET: at 06:01

## 2019-03-15 RX ADMIN — DOCUSATE SODIUM 100 MG: 100 CAPSULE, LIQUID FILLED ORAL at 17:49

## 2019-03-15 RX ADMIN — IBUPROFEN 800 MG: 800 TABLET, FILM COATED ORAL at 01:08

## 2019-03-15 RX ADMIN — OXYCODONE AND ACETAMINOPHEN 1 TABLET: 5; 325 TABLET ORAL at 01:08

## 2019-03-15 ASSESSMENT — EDINBURGH POSTNATAL DEPRESSION SCALE (EPDS)
THE THOUGHT OF HARMING MYSELF HAS OCCURRED TO ME: NEVER
THINGS HAVE BEEN GETTING ON TOP OF ME: YES, SOMETIMES I HAVEN'T BEEN COPING AS WELL AS USUAL
I HAVE BEEN ABLE TO LAUGH AND SEE THE FUNNY SIDE OF THINGS: AS MUCH AS I ALWAYS COULD
I HAVE FELT SAD OR MISERABLE: NOT VERY OFTEN
I HAVE BEEN SO UNHAPPY THAT I HAVE HAD DIFFICULTY SLEEPING: NOT VERY OFTEN
I HAVE BLAMED MYSELF UNNECESSARILY WHEN THINGS WENT WRONG: YES, SOME OF THE TIME
I HAVE BEEN ANXIOUS OR WORRIED FOR NO GOOD REASON: YES, SOMETIMES
I HAVE FELT SCARED OR PANICKY FOR NO GOOD REASON: YES, SOMETIMES
I HAVE LOOKED FORWARD WITH ENJOYMENT TO THINGS: AS MUCH AS I EVER DID
I HAVE BEEN SO UNHAPPY THAT I HAVE BEEN CRYING: ONLY OCCASIONALLY

## 2019-03-15 NOTE — PROGRESS NOTES
Patient alert, oriented X4, VSS. Fundus firm, lochia light . Patient voiding without difficulty, patient passing flatus. Patient bonding well with infant, encouraged to call with needs. Emergency/safety precautions reviewed with patient.

## 2019-03-15 NOTE — PROGRESS NOTES
S: pt pushing  O: VSS, afebrile  FHT: 130'S CATEGORY 1  TOCO: Q 2  Sve: C/C/+2    No pitocin    A/P: iup AT 40.2/COMPLETE  Push, expt .

## 2019-03-15 NOTE — ANESTHESIA QCDR
2019 [facility] Clinical Data Registry (for Quality Improvement)     Postoperative nausea/vomiting risk protocol (Adult = 18 yrs and Pediatric 3-17 yrs)- (430 and 463)  General inhalation anesthetic (NOT TIVA) with PONV risk factors: No  Provision of anti-emetic therapy with at least 2 different classes of agents: N/A  Patient DID NOT receive anti-emetic therapy and reason is documented in Medical Record: N/A    Multimodal Pain Management- (AQI59)  Patient undergoing Elective Surgery (i.e. Outpatient, or ASC, or Prescheduled Surgery prior to Hospital Admission): No  Use of Multimodal Pain Management, two or more drugs and/or interventions, NOT including systemic opioids: N/A  Exception: Documented allergy to multiple classes of analgesics: N/A    PACU assessment of acute postoperative pain prior to Anesthesia Care End- Applies to Patients Age = 18- (ABG7)  Initial PACU pain score is which of the following: < 7/10  Patient unable to report pain score: N/A    Post-anesthetic transfer of care checklist/protocol to PACU/ICU- (426 and 427)  Upon conclusion of case, patient transferred to which of the following locations: PACU/Non-ICU  Use of transfer checklist/protocol: Yes  Exclusion: Service Performed in Patient Hospital Room (and thus did not require transfer): N/A    PACU Reintubation- (AQI31)  General anesthesia requiring endotracheal intubation (ETT) along with subsequent extubation in OR or PACU: No  Required reintubation in the PACU: N/A  Extubation was a planned trial documented in the medical record prior to removal of the original airway device: N/A    Unplanned admission to ICU related to anesthesia service up through end of PACU care- (MD51)  Unplanned admission to ICU (not initially anticipated at anesthesia start time): No

## 2019-03-15 NOTE — CARE PLAN
Problem: Altered physiologic condition related to immediate post-delivery state and potential for bleeding/hemorrhage  Goal: Patient physiologically stable as evidenced by normal lochia, palpable uterine involution and vital signs within normal limits    Intervention: Massage fundus as necessary to prevent excessive lochia  Fundus firm, lochia light.      Problem: Alteration in comfort related to episiotomy, vaginal repair and/or after birth pains  Goal: Patient is able to ambulate, care for self and infant    Intervention: Obtain patient's pain goal  Discussed pain level with patient and pain medications available to patient. All questions answered at this time.

## 2019-03-15 NOTE — PROGRESS NOTES
-  Assumed care of pt, POC discussed. Pt verbalized understanding.     - Pt turned to hands and knees position. CUB used.    - Pt C/C/+2. Pt states she is numb. Trial push done. Pt turned to left side and peanut ball placed to labor down.     - Dr. Santana notified of pt status. Pt pushing started.  - Dr. Santana called to bedside.   - Dr Santana at bedside, pushing continued.  - , alive baby boy. Apgars 8/9 .  Infant placed skin to skin.   - Placenta out, pitocin bolus infusing. Fundus firm, lochia scant. Pericare provided.     0- Pericare provided. Fundus firm at umbilicus.     0045- Pericare provided. Pt states she doesn't feel like she has to void. Transferred to . SBAR endorsed to Tania BRAGG.

## 2019-03-15 NOTE — L&D DELIVERY NOTE
DATE OF SERVICE:  2019    ADMITTING DIAGNOSES  1.  Intrauterine pregnancy at 40 weeks and 2 days.  2.  Elective induction of labor.  3.  Group B strep negative.  4.  Depression, stable     PROCEDURES:  1.  Cytotec 25 mcg per vagina followed by Pitocin that was discontinued.  She   did get up to 4 milliunits.  2.  Epidural.  3.  Normal spontaneous vaginal delivery of a vigorous male with Apgars 8 and   9.    PROCEDURE:  This patient is a 23-year-old  1, para 0 that was admitted   at 40 weeks and 2 days for elective induction of labor.  When she arrived, she   was 1.5 cm dilated, 50% effaced, -3 station.  She was joshua   irregularly.  Her GBS status was negative; therefore, no antibiotics were   given.  She had one dose of 25 mcg of Cytotec per vagina.  Patient was   joshua too much for second dose and therefore was started on Pitocin.  At   12:39 p.m., she was on 4 milliunits of Pitocin.  At that time, the fetal   status was reassuring.  She was 3 cm dilated, 70% effaced, -1 station.    Artificial rupture of membranes was performed and it was clear.  The patient   progressed through labor without any problems.  She was having occasional   variable decelerations with a great contraction pattern and the Pitocin was   then discontinued.  At about 0637, she was on 4 milliunits and the Pitocin was   turned off.  Amnioinfusion was started since patient had a category strip   with occasional variables.  Without Pitocin, the fetal status remained   reassuring and the patient progressed to complete-complete and +2 station at   2100.  We allowed the patient to labor down and when she had the urge to push,   she started pushing at 2140.  She was then prepped and draped in usual   sterile fashion.  At 2214, I assisted with a normal spontaneous vaginal   delivery of a vigorous male.  The head was delivered atraumatically in PEACE   position and the rest of the infant delivered without any problems.  Mouth  and   nose were bulb suctioned.  Delayed cord clamping was performed and the infant   was placed on maternal abdomen.  Once the cord stopped pulsating, the cord   was doubly clamped and cut by the infant's father.  The placenta was then   delivered intact, 3-vessel cord was noted.  The uterus was firm and   hemostatic.  She did have bilateral first-degree vaginal lacerations that were   repaired with 2-0 Vicryl on a CT1 needle without any problems.  Patient   tolerated the procedure well.  Again she had artificial rupture of membranes   at 12:36, clear, completed at 2100, vaginal delivery of a vigorous male at   2214 and placenta delivered at 2218.  This was a vigorous male with Apgars 8   and 9.  Infant's weight 7 pounds 7 ounces.   mL.  Mom and baby are   doing well.       ____________________________________     MD OBED RAY / NIKKI    DD:  03/14/2019 23:07:53  DT:  03/15/2019 04:10:46    D#:  [id number]  Job#:  [id number]

## 2019-03-15 NOTE — DISCHARGE PLANNING
Discharge Planning Assessment Post Partum    Reason for Referral: History of depression, bipolar, and ADHD.  Address: [phone]  Type of Living Situation: living with FOB   Mom Diagnosis: Pregnancy  Baby Diagnosis: [place]  Primary Language: English    Name of Baby: Hima Ellington (: 3/14/19)  Father of the Baby: Noel Ellington  Involved in baby’s care? Yes  Contact Information: [phone]    Prenatal Care: Yes  Mom's PCP: None  PCP for new baby: Dr. Mendez    Support System: FOB and family  Coping/Bonding between mother & baby: Yes  Source of Feeding: breast feeding  Supplies for Infant: prepared for infant    Mom's Insurance: [place]  Baby Covered on Insurance:Yes  Mother Employed/School: [employer]  Other children in the home/names & ages: 1st baby    Financial Hardship/Income: denies   Mom's Mental status: alert and oriented  Services used prior to admit: none    CPS History: denies  Psychiatric History: depression, bipolar, and ADHD.  MOB states she is taking Seroquel 50 mg.  Provided MOB with counseling resources specializing in post partum depression.  Domestic Violence History: denies  Drug/ETOH History: denies    Resources Provided: children and family resource list, counseling resources for post partum depression  Referrals Made: diaper bank referral provided     Clearance for Discharge: Infant is cleared to discharge home with parents.

## 2019-03-15 NOTE — PROGRESS NOTES
Labor Progress Note    Jenni Perez   iup at 40.2/IOL      Subjective:  Pt comfortable with epidural.  Uterine contractions:no  Pain: No    Objective:   Vitals:    03/14/19 1353 03/14/19 1400 03/14/19 1500 03/14/19 1700   BP: 123/68 121/68 115/65 119/72   Pulse: 75 73 81 74   Temp:  36.6 °C (97.9 °F)     TempSrc:  Temporal     SpO2:  97%     Weight:       Height:         Fetal heart variability: 140's category 1 after pitocin d/c, occ variables with late return but with great return to baseline.  [place]: q 2-4  SVE: defer  Membranes ruptured: .yes    Meds:   Epidural : .yes  Pitocin: .no was on 4 mu and now off    Labs:  Recent Results (from the past 24 hour(s))   CBC WITH DIFFERENTIAL    Collection Time: 03/14/19  5:57 AM   Result Value Ref Range    WBC 11.6 (H) 4.8 - 10.8 K/uL    RBC 4.61 4.20 - 5.40 M/uL    Hemoglobin 13.9 12.0 - 16.0 g/dL    Hematocrit 42.2 37.0 - 47.0 %    MCV 91.5 81.4 - 97.8 fL    MCH 30.2 27.0 - 33.0 pg    MCHC 32.9 (L) 33.6 - 35.0 g/dL    RDW 42.7 35.9 - 50.0 fL    Platelet Count 230 164 - 446 K/uL    MPV 11.7 9.0 - 12.9 fL    Neutrophils-Polys 62.40 44.00 - 72.00 %    Lymphocytes 24.40 22.00 - 41.00 %    Monocytes 10.50 0.00 - 13.40 %    Eosinophils 1.40 0.00 - 6.90 %    Basophils 0.40 0.00 - 1.80 %    Immature Granulocytes 0.90 0.00 - 0.90 %    Nucleated RBC 0.00 /100 WBC    Neutrophils (Absolute) 7.23 (H) 2.00 - 7.15 K/uL    Lymphs (Absolute) 2.82 1.00 - 4.80 K/uL    Monos (Absolute) 1.22 (H) 0.00 - 0.85 K/uL    Eos (Absolute) 0.16 0.00 - 0.51 K/uL    Baso (Absolute) 0.05 0.00 - 0.12 K/uL    Immature Granulocytes (abs) 0.10 0.00 - 0.11 K/uL    NRBC (Absolute) 0.00 K/uL   HOLD BLOOD BANK SPECIMEN (NOT TESTED)    Collection Time: 03/14/19  5:57 AM   Result Value Ref Range    Holding Tube - Bb DONE        Assessment:   IUP at 40.2/iol:pt progressing, cpm. Will start amnioinfusion secondary to variables.  GBBS: negative  Fetal status: overral reassuring.   Kirti Santana

## 2019-03-15 NOTE — CARE PLAN
Problem: Altered physiologic condition related to immediate post-delivery state and potential for bleeding/hemorrhage  Goal: Patient physiologically stable as evidenced by normal lochia, palpable uterine involution and vital signs within normal limits  Outcome: PROGRESSING AS EXPECTED  VSS. Fundus firm, lochia light.

## 2019-03-15 NOTE — PROGRESS NOTES
Pt arrived to 21 via wheelchair with L&D RN. Report received from MAHSA [place]. Assessment completed. Fundus firm at umbilicus, lochia light/rubra. Pt ambulated to the bathroom and voided. Pericare was performed. Pt states 7/10 pain, will treat pain with prn medication per MAR. Pt oriented to room, call light, infant security, emergency light, and unit routine. Encouraged to call for assistance.

## 2019-03-15 NOTE — PROGRESS NOTES
Progress Note    Jenni Perez   PP day 1  Chief Admitting Dx: Pregnancy  IOL  Indication for care in labor or delivery  Delivery Type: vaginal, spontaneous      Subjective:  Pt without complaints. Pt is nursing baby. Pt with minimal lochia.   Ambulating: yes  Tolerating oral feed: yes  Flatus: yes    Voiding: yes  Dizziness: no  Vitals:    03/14/19 2305 03/15/19 0000 03/15/19 0200 03/15/19 0600   BP: 118/65 124/79 114/73 115/81   Pulse: 90 89 74 71   Resp:  17 16 17   Temp:  37.3 °C (99.2 °F) 36.8 °C (98.3 °F) 36.5 °C (97.7 °F)   TempSrc:  Temporal Temporal Temporal   SpO2:  94% 95% 93%   Weight:       Height:           Exam:  Gen: NAD  Abdomen: Abdomen soft, non-tender. BS normal. No masses,  No organomegaly  Fundus Tenderness: no  Below umbilicus: yes  Perineum: perineum intact  Extremities: 1+ edema  Lochia: mild    Labs:   Recent Results (from the past 24 hour(s))   CBC without differential    Collection Time: 03/15/19  5:22 AM   Result Value Ref Range    WBC 19.3 (H) 4.8 - 10.8 K/uL    RBC 4.27 4.20 - 5.40 M/uL    Hemoglobin 12.9 12.0 - 16.0 g/dL    Hematocrit 38.8 37.0 - 47.0 %    MCV 90.9 81.4 - 97.8 fL    MCH 30.2 27.0 - 33.0 pg    MCHC 33.2 (L) 33.6 - 35.0 g/dL    RDW 41.9 35.9 - 50.0 fL    Platelet Count 194 164 - 446 K/uL    MPV 11.7 9.0 - 12.9 fL       Assessment/Plan:  Continue Routine postpartum care  Consider Discharge 24h-48 hours   Depression: stable on Seroquel 50 mg one tab qd. Restart this am.     Kirti Santana M.D.

## 2019-03-15 NOTE — L&D DELIVERY NOTE
Delivery note  , PEACE, male with apgars 8 and 9. Placenta intact, 3 vc noted. Firm uterus, ebl: 300. Bilateral vaginal lacerations repaired with 2-0 vicryl ct-1 needle. Mom and baby doing well.

## 2019-03-15 NOTE — LACTATION NOTE
"Mother states breastfeeding is going well, denies pain and/or need for assistance at this time, discussed expected feeding frequency and duration, encouraged [id number], encouraged to call for assistance as needed    \"A New Beginning\" booklet provided and discussed assistance available at [facility] after discharge  "

## 2019-03-15 NOTE — ANESTHESIA TIME REPORT
Anesthesia Start and Stop Event Times     Date Time Event    3/14/2019 1416 Anesthesia Start     2214 Anesthesia Stop        Responsible Staff  03/14/19    Name Role Begin End    Arvind Abrams M.D. Anesth 1416 2214        Post op Diagnosis  Distress from pain in labor    Premium Reason  A. 3PM - 7AM      Exception Times    Start Time             End Time                    Dr. Velma Epstein

## 2019-03-15 NOTE — CARE PLAN
Problem: Alteration in comfort related to episiotomy, vaginal repair and/or after birth pains  Goal: Patient verbalizes acceptable pain level  Outcome: PROGRESSING AS EXPECTED  Pain controlled with PRN medications per MAR

## 2019-03-16 VITALS
HEIGHT: 69 IN | RESPIRATION RATE: 17 BRPM | HEART RATE: 63 BPM | SYSTOLIC BLOOD PRESSURE: 108 MMHG | WEIGHT: 200 LBS | BODY MASS INDEX: 29.62 KG/M2 | OXYGEN SATURATION: 98 % | TEMPERATURE: 98.1 F | DIASTOLIC BLOOD PRESSURE: 67 MMHG

## 2019-03-16 PROCEDURE — 700112 HCHG RX REV CODE 229: Performed by: OBSTETRICS & GYNECOLOGY

## 2019-03-16 PROCEDURE — A9270 NON-COVERED ITEM OR SERVICE: HCPCS | Performed by: OBSTETRICS & GYNECOLOGY

## 2019-03-16 PROCEDURE — 700102 HCHG RX REV CODE 250 W/ 637 OVERRIDE(OP): Performed by: OBSTETRICS & GYNECOLOGY

## 2019-03-16 RX ORDER — IBUPROFEN 600 MG/1
600 TABLET ORAL EVERY 6 HOURS PRN
Qty: 20 TAB | Refills: 1 | Status: SHIPPED | OUTPATIENT
Start: 2019-03-16

## 2019-03-16 RX ORDER — PSEUDOEPHEDRINE HCL 30 MG
100 TABLET ORAL 2 TIMES DAILY PRN
COMMUNITY
Start: 2019-03-16

## 2019-03-16 RX ADMIN — DOCUSATE SODIUM 100 MG: 100 CAPSULE, LIQUID FILLED ORAL at 09:48

## 2019-03-16 RX ADMIN — Medication 1 TABLET: at 09:49

## 2019-03-16 NOTE — PROGRESS NOTES
Patient alert, oriented X4, VSS. Fundus firm, lochia scant . Patient voiding without difficulty, patient passing flatus. Patient bonding well with infant, encouraged to call with needs. Emergency/safety precautions reviewed with patient.

## 2019-03-16 NOTE — CARE PLAN
Problem: Pain Management  Goal: Pain level will decrease to patient's comfort goal  Outcome: PROGRESSING AS EXPECTED  Pt denies pain at this time. Pt will call to request PRN medications    Problem: Altered physiologic condition related to immediate post-delivery state and potential for bleeding/hemorrhage  Goal: Patient physiologically stable as evidenced by normal lochia, palpable uterine involution and vital signs within normal limits  Outcome: PROGRESSING AS EXPECTED  VSS. Fundus firm, lochia light.

## 2019-03-16 NOTE — CARE PLAN
Problem: Fluid Volume:  Goal: Will maintain balanced intake and output  Outcome: PROGRESSING AS EXPECTED  Patient eating and drinking adequately.    Problem: Altered physiologic condition related to immediate post-delivery state and potential for bleeding/hemorrhage  Goal: Patient physiologically stable as evidenced by normal lochia, palpable uterine involution and vital signs within normal limits    Intervention: Massage fundus as necessary to prevent excessive lochia  Fundus firm, lochia light.

## 2019-03-16 NOTE — PROGRESS NOTES
POSTPARTUM DAY 1+    No complaints.  Patient is doing well with infant care and lactation issues.  Patient is voiding well.    PE:    Afebrile  BP normal    Uterus involuting appropriately, nontender  Perineum:  No perineal complaints, so I didn't do specific perineal exam.  Calves nontender, Graciela negative bilaterally.     LAB:      Results for DAVID URBANO (MRN [id number]) as of 3/16/2019 05:49   3/14/2019 05:57 3/15/2019 05:22   WBC 11.6 (H) 19.3 (H)   Hemoglobin 13.9 12.9   Hematocrit 42.2 38.8   Platelet Count 230 194        PLAN:  Postpartum care.  Lactation consult.  Patient desires discharge:  today  .    Prescriptions:   PNV, ibuprofen 600 mg .  Followup plans:   6 wks .

## 2019-03-16 NOTE — PROGRESS NOTES
Patient discharged off of unit, infant in car seat in stable condition. Car seat safety check completed. Emphasized importance of  screen to be completed following discharge (dates given). Cuddles tag & umbilical clamp removed. All questions answered at this time.

## 2019-03-16 NOTE — PROGRESS NOTES
Assessment complete. VSS. Fundus firm, lochia light. Pt denies pain at this time. Pain controlled with prn medications per mar. Pt will call to request pain medications. States voiding without difficulty. POC discussed. Encouraged to call with needs. Call light in place

## 2019-06-09 ENCOUNTER — OFFICE VISIT (OUTPATIENT)
Dept: URGENT CARE | Facility: PHYSICIAN GROUP | Age: 24
End: 2019-06-09
Payer: COMMERCIAL

## 2019-06-09 ENCOUNTER — HOSPITAL ENCOUNTER (OUTPATIENT)
Facility: MEDICAL CENTER | Age: 24
End: 2019-06-09
Attending: PHYSICIAN ASSISTANT
Payer: COMMERCIAL

## 2019-06-09 VITALS
OXYGEN SATURATION: 99 % | DIASTOLIC BLOOD PRESSURE: 84 MMHG | RESPIRATION RATE: 12 BRPM | HEIGHT: 69 IN | BODY MASS INDEX: 25.62 KG/M2 | HEART RATE: 71 BPM | SYSTOLIC BLOOD PRESSURE: 142 MMHG | TEMPERATURE: 98.7 F | WEIGHT: 173 LBS

## 2019-06-09 DIAGNOSIS — R10.2 PELVIC PAIN IN FEMALE: ICD-10-CM

## 2019-06-09 DIAGNOSIS — S39.012A ACUTE MYOFASCIAL STRAIN OF LUMBOSACRAL REGION, INITIAL ENCOUNTER: ICD-10-CM

## 2019-06-09 DIAGNOSIS — S39.012A ACUTE MYOFASCIAL STRAIN OF LUMBOSACRAL REGION, INITIAL ENCOUNTER: Primary | ICD-10-CM

## 2019-06-09 LAB
APPEARANCE UR: CLEAR
BILIRUB UR STRIP-MCNC: NORMAL MG/DL
COLOR UR AUTO: NORMAL
GLUCOSE UR STRIP.AUTO-MCNC: NORMAL MG/DL
INT CON NEG: NEGATIVE
INT CON POS: POSITIVE
KETONES UR STRIP.AUTO-MCNC: NORMAL MG/DL
LEUKOCYTE ESTERASE UR QL STRIP.AUTO: NORMAL
NITRITE UR QL STRIP.AUTO: NORMAL
PH UR STRIP.AUTO: 5.5 [PH] (ref 5–8)
POC URINE PREGNANCY TEST: NORMAL
PROT UR QL STRIP: NORMAL MG/DL
RBC UR QL AUTO: NORMAL
SP GR UR STRIP.AUTO: 1.02
UROBILINOGEN UR STRIP-MCNC: 0.2 MG/DL

## 2019-06-09 PROCEDURE — 81025 URINE PREGNANCY TEST: CPT | Performed by: PHYSICIAN ASSISTANT

## 2019-06-09 PROCEDURE — 81002 URINALYSIS NONAUTO W/O SCOPE: CPT | Performed by: PHYSICIAN ASSISTANT

## 2019-06-09 PROCEDURE — 99214 OFFICE O/P EST MOD 30 MIN: CPT | Performed by: PHYSICIAN ASSISTANT

## 2019-06-09 PROCEDURE — 87086 URINE CULTURE/COLONY COUNT: CPT

## 2019-06-09 RX ORDER — TRAZODONE HYDROCHLORIDE 50 MG/1
TABLET ORAL
Refills: 1 | COMMUNITY
Start: 2019-05-07

## 2019-06-09 RX ORDER — DESOGESTREL AND ETHINYL ESTRADIOL AND ETHINYL ESTRADIOL 21-5 (28)
1 KIT ORAL
Refills: 12 | COMMUNITY
Start: 2019-05-16

## 2019-06-09 ASSESSMENT — ENCOUNTER SYMPTOMS: BACK PAIN: 1

## 2019-06-09 NOTE — PROGRESS NOTES
Subjective:      Jenni Perez is a 23 y.o. female who presents with Back Pain (lower back pain and lower abd pain with spotting.  Had baby 3 months ago)    PMH:  has a past medical history of ADHD; Bipolar 1 disorder (HCC); and Depression.  MEDS:   Current Outpatient Prescriptions:   •  sertraline (ZOLOFT) 50 MG Tab, , Disp: , Rfl: 0  •  traZODone (DESYREL) 50 MG Tab, , Disp: , Rfl: 1  •  VIORELE 0.15-0.02/0.01 MG (21/5) per tablet, Take 1 Tab by mouth every day., Disp: , Rfl: 12  •  docusate sodium 100 MG Cap, Take 100 mg by mouth 2 times a day as needed for Constipation., Disp: , Rfl:   •  ibuprofen (MOTRIN) 600 MG Tab, Take 1 Tab by mouth every 6 hours as needed for Mild Pain or Moderate Pain., Disp: 20 Tab, Rfl: 1  •  Prenatal Vit-Fe Fumarate-FA (PRENATAL 1+1 PO), Take  by mouth., Disp: , Rfl:   •  QUETIAPINE FUMARATE PO, Take  by mouth., Disp: , Rfl:   ALLERGIES: No Known Allergies  SURGHX: No past surgical history on file.  SOCHX:  reports that she has never smoked. She has never used smokeless tobacco. She reports that she does not drink alcohol or use drugs.  FH: Reviewed with patient, not pertinent to this visit.           Patient presents clinic today with complaint of low back pain muscle cramping and lower abdominal pelvic pain, menstrual cramping and spotting over the last 3 days.      Patient is 3 months status post vaginal delivery, has not had a normal period yet so she thinks this may be what is causing been lower abdominal cramping.  Patient denies fever, chills, nausea, vomiting or diarrhea.  Patient has had 2 follow-up appointments with her OB after delivery which were normal, and she has been started on birth control pills this month.  Patient denies vaginal discharge, vaginal itching.    Patient's back pain is low back, pain with bending, lifting carrying actually more just sore and fatigued than painful in 1 place.  Patient states she did start a new job a month ago which requires a lot of  "physical activity.  Patient has not been taking any Tylenol or Motrin for her back pain she was not sure what to take.  Patient denies numbness or tingling to low back bilateral lower extremities.      Back Pain   This is a new problem. The current episode started 1 to 4 weeks ago. The problem occurs daily. The problem has been waxing and waning since onset. The pain is present in the lumbar spine. The quality of the pain is described as aching. The pain does not radiate. The pain is at a severity of 5/10. The pain is worse during the night. The symptoms are aggravated by bending, twisting and position. Pertinent negatives include no bladder incontinence, bowel incontinence, dysuria, fever, leg pain, numbness, paresthesias, perianal numbness or tingling. She has tried nothing for the symptoms. The treatment provided no relief.       Review of Systems   Constitutional: Negative for chills and fever.   Gastrointestinal: Negative for bowel incontinence.   Genitourinary: Negative for bladder incontinence, dysuria, flank pain, frequency, hematuria and urgency.   Musculoskeletal: Positive for back pain and myalgias.   Neurological: Negative for tingling, numbness and paresthesias.   All other systems reviewed and are negative.         Objective:     /84 (BP Location: Left arm, Patient Position: Sitting, BP Cuff Size: Adult)   Pulse 71   Temp 37.1 °C (98.7 °F) (Temporal)   Resp 12   Ht 1.74 m (5' 8.5\")   Wt 78.5 kg (173 lb)   LMP 05/08/2019 (Exact Date)   SpO2 99%   Breastfeeding? No   BMI 25.92 kg/m²      Physical Exam   Constitutional: She is oriented to person, place, and time. She appears well-developed and well-nourished. No distress.   HENT:   Head: Normocephalic and atraumatic.   Nose: Nose normal.   Mouth/Throat: Oropharynx is clear and moist.   Eyes: Pupils are equal, round, and reactive to light. Conjunctivae and EOM are normal.   Neck: Normal range of motion. Neck supple.   Cardiovascular: Normal " rate and regular rhythm.    Pulmonary/Chest: Effort normal and breath sounds normal.   Abdominal: Soft. Bowel sounds are normal. She exhibits no distension and no mass. There is no tenderness. There is no rebound and no guarding.   Musculoskeletal: Normal range of motion.        Lumbar back: She exhibits tenderness, pain and spasm. She exhibits no bony tenderness and normal pulse.        Back:    Neurological: She is alert and oriented to person, place, and time. She has normal strength and normal reflexes. She exhibits normal muscle tone. Coordination and gait normal.   Skin: Skin is warm and dry. Capillary refill takes less than 2 seconds.   Psychiatric: She has a normal mood and affect.   Nursing note and vitals reviewed.    UA results interpreted by me: neg  Preg: neg     Assessment/Plan:     1. Acute myofascial strain of lumbosacral region, initial encounter  POCT Urinalysis    POCT Pregnancy    URINE CULTURE(NEW)   2. Pelvic pain in female  POCT Urinalysis    POCT Pregnancy    URINE CULTURE(NEW)     We had a discussion about anti-inflammatories, Motrin or Aleve, for both complaints.  Patient's pelvic pain is likely due to first oncoming menstrual cycle after vaginal delivery.  Low back pain is likely from her new job which requires a lot of heavy lifting pushing and pulling which she is not used to.  I feel anti-inflammatories would treat both complaints at the same time.  Patient verbalized understanding of these things, we also did talk about good body mechanics and good lifting posture.    PT should follow up with PCP in 1-2 days for re-evaluation if symptoms have not improved.  Discussed red flags and reasons to return to UC or ED.  Pt and/or family verbalized understanding of diagnosis and follow up instructions and was offered informational handout on diagnosis.  PT discharged.

## 2019-06-11 LAB
BACTERIA UR CULT: NORMAL
SIGNIFICANT IND 70042: NORMAL
SITE SITE: NORMAL
SOURCE SOURCE: NORMAL

## 2019-06-13 ASSESSMENT — ENCOUNTER SYMPTOMS
NUMBNESS: 0
PERIANAL NUMBNESS: 0
PARESTHESIAS: 0
TINGLING: 0
FLANK PAIN: 0
BOWEL INCONTINENCE: 0
LEG PAIN: 0
FEVER: 0
CHILLS: 0
MYALGIAS: 1

## 2021-12-16 ENCOUNTER — HOSPITAL ENCOUNTER (EMERGENCY)
Facility: MEDICAL CENTER | Age: 26
End: 2021-12-16
Attending: EMERGENCY MEDICINE
Payer: COMMERCIAL

## 2021-12-16 ENCOUNTER — APPOINTMENT (OUTPATIENT)
Dept: RADIOLOGY | Facility: MEDICAL CENTER | Age: 26
End: 2021-12-16
Attending: EMERGENCY MEDICINE
Payer: COMMERCIAL

## 2021-12-16 VITALS
RESPIRATION RATE: 16 BRPM | HEIGHT: 69 IN | BODY MASS INDEX: 25.44 KG/M2 | HEART RATE: 85 BPM | WEIGHT: 171.74 LBS | TEMPERATURE: 98.2 F | OXYGEN SATURATION: 96 % | DIASTOLIC BLOOD PRESSURE: 57 MMHG | SYSTOLIC BLOOD PRESSURE: 120 MMHG

## 2021-12-16 DIAGNOSIS — O20.0 THREATENED MISCARRIAGE IN EARLY PREGNANCY: ICD-10-CM

## 2021-12-16 LAB
ALBUMIN SERPL BCP-MCNC: 4.8 G/DL (ref 3.2–4.9)
ALBUMIN/GLOB SERPL: 1.5 G/DL
ALP SERPL-CCNC: 78 U/L (ref 30–99)
ALT SERPL-CCNC: 23 U/L (ref 2–50)
ANION GAP SERPL CALC-SCNC: 14 MMOL/L (ref 7–16)
ANISOCYTOSIS BLD QL SMEAR: ABNORMAL
APPEARANCE UR: CLEAR
AST SERPL-CCNC: 22 U/L (ref 12–45)
B-HCG SERPL-ACNC: 195 MIU/ML (ref 0–5)
BACTERIA #/AREA URNS HPF: NEGATIVE /HPF
BASOPHILS # BLD AUTO: 1.8 % (ref 0–1.8)
BASOPHILS # BLD: 0.26 K/UL (ref 0–0.12)
BILIRUB SERPL-MCNC: 0.2 MG/DL (ref 0.1–1.5)
BILIRUB UR QL STRIP.AUTO: NEGATIVE
BUN SERPL-MCNC: 9 MG/DL (ref 8–22)
CALCIUM SERPL-MCNC: 9.4 MG/DL (ref 8.5–10.5)
CHLORIDE SERPL-SCNC: 104 MMOL/L (ref 96–112)
CO2 SERPL-SCNC: 20 MMOL/L (ref 20–33)
COLOR UR: YELLOW
CREAT SERPL-MCNC: 0.5 MG/DL (ref 0.5–1.4)
EKG IMPRESSION: NORMAL
EOSINOPHIL # BLD AUTO: 0 K/UL (ref 0–0.51)
EOSINOPHIL NFR BLD: 0 % (ref 0–6.9)
EPI CELLS #/AREA URNS HPF: NEGATIVE /HPF
ERYTHROCYTE [DISTWIDTH] IN BLOOD BY AUTOMATED COUNT: 39.8 FL (ref 35.9–50)
GLOBULIN SER CALC-MCNC: 3.1 G/DL (ref 1.9–3.5)
GLUCOSE SERPL-MCNC: 97 MG/DL (ref 65–99)
GLUCOSE UR STRIP.AUTO-MCNC: NEGATIVE MG/DL
HCT VFR BLD AUTO: 43.3 % (ref 37–47)
HGB BLD-MCNC: 14.2 G/DL (ref 12–16)
HYALINE CASTS #/AREA URNS LPF: ABNORMAL /LPF
HYPOCHROMIA BLD QL SMEAR: ABNORMAL
KETONES UR STRIP.AUTO-MCNC: NEGATIVE MG/DL
LEUKOCYTE ESTERASE UR QL STRIP.AUTO: NEGATIVE
LIPASE SERPL-CCNC: 47 U/L (ref 11–82)
LYMPHOCYTES # BLD AUTO: 4.13 K/UL (ref 1–4.8)
LYMPHOCYTES NFR BLD: 28.9 % (ref 22–41)
MANUAL DIFF BLD: NORMAL
MCH RBC QN AUTO: 27.7 PG (ref 27–33)
MCHC RBC AUTO-ENTMCNC: 32.8 G/DL (ref 33.6–35)
MCV RBC AUTO: 84.6 FL (ref 81.4–97.8)
MICRO URNS: ABNORMAL
MONOCYTES # BLD AUTO: 1.13 K/UL (ref 0–0.85)
MONOCYTES NFR BLD AUTO: 7.9 % (ref 0–13.4)
MORPHOLOGY BLD-IMP: NORMAL
NEUTROPHILS # BLD AUTO: 8.78 K/UL (ref 2–7.15)
NEUTROPHILS NFR BLD: 61.4 % (ref 44–72)
NITRITE UR QL STRIP.AUTO: NEGATIVE
NRBC # BLD AUTO: 0 K/UL
NRBC BLD-RTO: 0 /100 WBC
PH UR STRIP.AUTO: 5.5 [PH] (ref 5–8)
PLATELET # BLD AUTO: 355 K/UL (ref 164–446)
PLATELET BLD QL SMEAR: NORMAL
PMV BLD AUTO: 10.8 FL (ref 9–12.9)
POIKILOCYTOSIS BLD QL SMEAR: NORMAL
POLYCHROMASIA BLD QL SMEAR: NORMAL
POTASSIUM SERPL-SCNC: 3.8 MMOL/L (ref 3.6–5.5)
PROT SERPL-MCNC: 7.9 G/DL (ref 6–8.2)
PROT UR QL STRIP: NEGATIVE MG/DL
RBC # BLD AUTO: 5.12 M/UL (ref 4.2–5.4)
RBC # URNS HPF: ABNORMAL /HPF
RBC BLD AUTO: PRESENT
RBC UR QL AUTO: ABNORMAL
SODIUM SERPL-SCNC: 138 MMOL/L (ref 135–145)
SP GR UR STRIP.AUTO: 1.01
UROBILINOGEN UR STRIP.AUTO-MCNC: 0.2 MG/DL
WBC # BLD AUTO: 14.3 K/UL (ref 4.8–10.8)
WBC #/AREA URNS HPF: ABNORMAL /HPF

## 2021-12-16 PROCEDURE — 85007 BL SMEAR W/DIFF WBC COUNT: CPT

## 2021-12-16 PROCEDURE — 85027 COMPLETE CBC AUTOMATED: CPT

## 2021-12-16 PROCEDURE — 83690 ASSAY OF LIPASE: CPT

## 2021-12-16 PROCEDURE — 93005 ELECTROCARDIOGRAM TRACING: CPT

## 2021-12-16 PROCEDURE — 93005 ELECTROCARDIOGRAM TRACING: CPT | Performed by: EMERGENCY MEDICINE

## 2021-12-16 PROCEDURE — 99284 EMERGENCY DEPT VISIT MOD MDM: CPT

## 2021-12-16 PROCEDURE — 76801 OB US < 14 WKS SINGLE FETUS: CPT

## 2021-12-16 PROCEDURE — 80053 COMPREHEN METABOLIC PANEL: CPT

## 2021-12-16 PROCEDURE — 81001 URINALYSIS AUTO W/SCOPE: CPT

## 2021-12-16 PROCEDURE — 84702 CHORIONIC GONADOTROPIN TEST: CPT

## 2021-12-16 ASSESSMENT — ENCOUNTER SYMPTOMS: BACK PAIN: 0

## 2021-12-17 NOTE — ED PROVIDER NOTES
ED Provider Note     2021  5:55 PM    Means of Arrival: Walk In  History obtained by: patient  Limitations: None  OBGYN: Dr. Max GAFFNEY    CHIEF COMPLAINT  Pregnant with vaginal bleeding  Chief Complaint   Patient presents with   • Dizziness   • Vaginal Bleeding     HPI  Jenni Perez is a 26 y.o. female  presents approximately 5 weeks pregnant and now having vaginal bleeding. LMP was 2021. On  she noted her first positive pregnancy test. She has taken multiple pregnancy tests due to bleeding.  She notes that the vaginal bleeding onset on , and has been consistent since. She bleeds through about one pad a day.  Minimal bleeding at this time.  She denies any associated vaginal or back pain, but does endorse lightheadedness.  She has an appointment later this month with her OB/GYN provider Dr. Santana.  She has been in contact with them regarding the bleeding.  She has no localized abdominal pain besides pelvic cramping.    REVIEW OF SYSTEMS  Review of Systems   Genitourinary:        Positive vaginal bleeding with no increased pain   Musculoskeletal: Negative for back pain.   Neurological:        Positive lightheadedness   All other systems reviewed and are negative.    See HPI for further details.    PAST MEDICAL HISTORY   has a past medical history of ADHD, Bipolar 1 disorder (HCC), and Depression.    SOCIAL HISTORY  Social History     Tobacco Use   • Smoking status: Never Smoker   • Smokeless tobacco: Never Used   Substance and Sexual Activity   • Alcohol use: No   • Drug use: No   • Sexual activity: Yes     Partners: Male       SURGICAL HISTORY  patient denies any surgical history    CURRENT MEDICATIONS  Current Outpatient Medications   Medication Instructions   • docusate sodium 100 mg, Oral, 2 TIMES DAILY PRN   • ibuprofen (MOTRIN) 600 mg, Oral, EVERY 6 HOURS PRN   • Prenatal Vit-Fe Fumarate-FA (PRENATAL 1+1 PO) Oral   • QUETIAPINE FUMARATE PO Oral   • sertraline (ZOLOFT) 50  "MG Tab No dose, route, or frequency recorded.   • traZODone (DESYREL) 50 MG Tab No dose, route, or frequency recorded.   • VIORELE 0.15-0.02/0.01 MG (21/5) per tablet 1 Tablet, Oral, EVERY DAY     ALLERGIES  No Known Allergies    PHYSICAL EXAM  VITAL SIGNS: /81   Pulse 96   Temp 36.8 °C (98.3 °F) (Temporal)   Resp 16   Ht 1.753 m (5' 9\")   Wt 77.9 kg (171 lb 11.8 oz)   LMP 11/08/2021   SpO2 98%   BMI 25.36 kg/m²       Pulse ox interpretation: I interpret this pulse ox as normal.  Constitutional: Alert in no apparent distress.  HENT: No signs of trauma, Bilateral external ears normal, Nose normal.   Eyes: Pupils are equal, Conjunctiva normal, Non-icteric.   Neck: Normal range of motion, No tenderness, Supple, No stridor.   Cardiovascular: Normal rate and regular rhythm, no murmurs. Symmetric distal pulses. No cyanosis of extremities. No peripheral edema of extremities.  Thorax & Lungs: Normal breath sounds, No respiratory distress, No wheezing, No chest tenderness.   Abdomen: Soft, No tenderness, No masses, No pulsatile masses. No peritoneal signs.  : Offered and after joint discussion, exam was deferred due to low utility, unlikely to change disposition or management course today.  She is not having any bleeding or pain at this time.   Skin: Warm, Dry, No erythema, No rash.   Back: No midline bony tenderness, No CVA tenderness.   Musculoskeletal: Good range of motion in all major joints. No tenderness to palpation or major deformities noted.   Neurologic: Alert , Normal motor function, Normal sensory function, No focal deficits noted.   Psychiatric: Affect normal, Judgment normal, Mood normal.     DIAGNOSTIC STUDIES / PROCEDURES    LABS  Results for orders placed or performed during the hospital encounter of 12/16/21   CBC WITH DIFFERENTIAL   Result Value Ref Range    WBC 14.3 (H) 4.8 - 10.8 K/uL    RBC 5.12 4.20 - 5.40 M/uL    Hemoglobin 14.2 12.0 - 16.0 g/dL    Hematocrit 43.3 37.0 - 47.0 %    MCV " 84.6 81.4 - 97.8 fL    MCH 27.7 27.0 - 33.0 pg    MCHC 32.8 (L) 33.6 - 35.0 g/dL    RDW 39.8 35.9 - 50.0 fL    Platelet Count 355 164 - 446 K/uL    MPV 10.8 9.0 - 12.9 fL    Neutrophils-Polys 61.40 44.00 - 72.00 %    Lymphocytes 28.90 22.00 - 41.00 %    Monocytes 7.90 0.00 - 13.40 %    Eosinophils 0.00 0.00 - 6.90 %    Basophils 1.80 0.00 - 1.80 %    Nucleated RBC 0.00 /100 WBC    Neutrophils (Absolute) 8.78 (H) 2.00 - 7.15 K/uL    Lymphs (Absolute) 4.13 1.00 - 4.80 K/uL    Monos (Absolute) 1.13 (H) 0.00 - 0.85 K/uL    Eos (Absolute) 0.00 0.00 - 0.51 K/uL    Baso (Absolute) 0.26 (H) 0.00 - 0.12 K/uL    NRBC (Absolute) 0.00 K/uL    Hypochromia 1+     Anisocytosis 1+    COMP METABOLIC PANEL   Result Value Ref Range    Sodium 138 135 - 145 mmol/L    Potassium 3.8 3.6 - 5.5 mmol/L    Chloride 104 96 - 112 mmol/L    Co2 20 20 - 33 mmol/L    Anion Gap 14.0 7.0 - 16.0    Glucose 97 65 - 99 mg/dL    Bun 9 8 - 22 mg/dL    Creatinine 0.50 0.50 - 1.40 mg/dL    Calcium 9.4 8.5 - 10.5 mg/dL    AST(SGOT) 22 12 - 45 U/L    ALT(SGPT) 23 2 - 50 U/L    Alkaline Phosphatase 78 30 - 99 U/L    Total Bilirubin 0.2 0.1 - 1.5 mg/dL    Albumin 4.8 3.2 - 4.9 g/dL    Total Protein 7.9 6.0 - 8.2 g/dL    Globulin 3.1 1.9 - 3.5 g/dL    A-G Ratio 1.5 g/dL   LIPASE   Result Value Ref Range    Lipase 47 11 - 82 U/L   HCG QUANTITATIVE   Result Value Ref Range    Bhcg 195.0 (H) 0.0 - 5.0 mIU/mL   URINALYSIS,CULTURE IF INDICATED    Specimen: Urine   Result Value Ref Range    Color Yellow     Character Clear     Specific Gravity 1.013 <1.035    Ph 5.5 5.0 - 8.0    Glucose Negative Negative mg/dL    Ketones Negative Negative mg/dL    Protein Negative Negative mg/dL    Bilirubin Negative Negative    Urobilinogen, Urine 0.2 Negative    Nitrite Negative Negative    Leukocyte Esterase Negative Negative    Occult Blood Moderate (A) Negative    Micro Urine Req Microscopic    ESTIMATED GFR   Result Value Ref Range    GFR If African American >60 >60 mL/min/1.73  m 2    GFR If Non African American >60 >60 mL/min/1.73 m 2   PERIPHERAL SMEAR REVIEW   Result Value Ref Range    Peripheral Smear Review see below    PLATELET ESTIMATE   Result Value Ref Range    Plt Estimation Normal    MORPHOLOGY   Result Value Ref Range    RBC Morphology Present     Polychromia 1+     Poikilocytosis 1+    DIFFERENTIAL MANUAL   Result Value Ref Range    Manual Diff Status PERFORMED    URINE MICROSCOPIC (W/UA)   Result Value Ref Range    WBC 0-2 /hpf    RBC 2-5 (A) /hpf    Bacteria Negative None /hpf    Epithelial Cells Negative /hpf    Hyaline Cast 0-2 /lpf   EKG   Result Value Ref Range    Report       [facility] Emergency Dept.    Test Date:  2021  Pt Name:    DAVID URBANO                Department: ER  MRN:        [id number]                      Room:  Gender:     Female                       Technician: [id number]  :        [id number]                   Requested By:ER TRIAGE PROTOCOL  Order #:    [id number]                    Reading MD: Johnny Patton II, MD    Measurements  Intervals                                Axis  Rate:       83                           P:          42  NC:         148                          QRS:        80  QRSD:       92                           T:          56  QT:         376  QTc:        442    Interpretive Statements  SINUS RHYTHM  Rate 83  Normal intervals  No ST elevation or depression  Upright T waves  Impression: Normal sinus rhythm EKG  No previous ECG available for comparison  Electronically Signed On 2021 20:00:36 PST by Johnny Patton II, MD     Pertinent Labs & Imaging studies reviewed. (See chart for details)    RADIOLOGY  US-OB 1ST TRIMESTER WITH TRANSVAGINAL (COMBO)   Final Result      1.  No intrauterine pregnancy is identified. Findings are nonspecific and may represent early intrauterine pregnancy or failed first trimester pregnancy. Continued follow-up is recommended.      Pertinent Labs & Imaging studies  reviewed. (See chart for details)    COURSE & MEDICAL DECISION MAKING  Pertinent Labs & Imaging studies reviewed. (See chart for details)    5:55 PM This is an emergent evaluation of a  26 y.o. female  approximately 5 weeks pregnant who presents with vaginal bleeding. The differential diagnosis includes but is not limited to Miscarriage, Ectopic pregnancy. Ordered for cbc, cmp, UA, betaHCG quant, and a pelvic US to evaluate.       8:03 PM -White count 14.3.  Hemoglobin 14.2.  Metabolic panel within acceptable limits.  Beta-hCG is 195.  Urinalysis without suggestion of infection.  There is no bacteria.  Ultrasound did not show any intrauterine pregnancy.  This could be because of a early intrauterine pregnancy or miscarriage.  Patient reevaluated at bedside. Discussed plan for discharge; I advised the patient to follow-up with OB/Gyn tomorrow morning, and to return to the [facility] ED with any new or worsening symptoms, including worsening vaginal bleeding, fever, and cramping. Patient was given the opportunity for questions. I addressed all questions or concerns at this time and they verbalize agreement to the plan of care.     DISPOSITION:  Patient will be discharged home in stable condition.    FOLLOW UP:  Kirti Santana M.D.  [address] [place] [id number] [phone]    Call in 1 day  To arrange for close follow up, repeat BHCG testing. Bhcg today is 195.      OUTPATIENT MEDICATIONS:  Discharge Medication List as of 2021  8:06 PM          FINAL IMPRESSION    ICD-10-CM   1. Threatened miscarriage in early pregnancy  O20.0        This dictation was created using voice recognition software. The accuracy of the dictation is limited to the abilities of the software. I expect there may be some errors of grammar and possibly content. The nursing notes were reviewed and certain aspects of this information were incorporated into this note.    Electronically signed by: Johnny Patton II,  M.D., 12/16/2021

## 2021-12-17 NOTE — ED NOTES
Pt ambulated with a normal, steady gait to the room from the [facility]. Pt asked to change into a gown and placed on a pulse ox and bp cuff.

## 2021-12-17 NOTE — ED TRIAGE NOTES
"Chief Complaint   Patient presents with   • Dizziness   • Vaginal Bleeding     Pt took positive pregnancy test on 8th.  Pt has been spotting since Sunday states she has not soaked through a normal pad. Pt does endorse dizziness reports near syncopal episode this AM upon further questioning. Pt reports mild pelvic cramping 2/10 that waxes and wanes.     /81   Pulse 96   Temp 36.8 °C (98.3 °F) (Temporal)   Resp 16   Ht 1.753 m (5' 9\")   Wt 77.9 kg (171 lb 11.8 oz)   SpO2 98%   BMI 25.36 kg/m²     "

## 2023-09-07 ENCOUNTER — APPOINTMENT (OUTPATIENT)
Dept: RADIOLOGY | Facility: MEDICAL CENTER | Age: 28
End: 2023-09-07
Attending: STUDENT IN AN ORGANIZED HEALTH CARE EDUCATION/TRAINING PROGRAM

## 2023-09-07 ENCOUNTER — HOSPITAL ENCOUNTER (EMERGENCY)
Facility: MEDICAL CENTER | Age: 28
End: 2023-09-07
Attending: STUDENT IN AN ORGANIZED HEALTH CARE EDUCATION/TRAINING PROGRAM

## 2023-09-07 VITALS
HEIGHT: 69 IN | WEIGHT: 152.56 LBS | BODY MASS INDEX: 22.6 KG/M2 | RESPIRATION RATE: 15 BRPM | OXYGEN SATURATION: 98 % | DIASTOLIC BLOOD PRESSURE: 61 MMHG | TEMPERATURE: 98.5 F | SYSTOLIC BLOOD PRESSURE: 123 MMHG | HEART RATE: 90 BPM

## 2023-09-07 DIAGNOSIS — N76.0 BACTERIAL VAGINOSIS: ICD-10-CM

## 2023-09-07 DIAGNOSIS — B96.89 BACTERIAL VAGINOSIS: ICD-10-CM

## 2023-09-07 DIAGNOSIS — O36.80X0 PREGNANCY, LOCATION UNKNOWN: ICD-10-CM

## 2023-09-07 LAB
ALBUMIN SERPL BCP-MCNC: 4.7 G/DL (ref 3.2–4.9)
ALBUMIN/GLOB SERPL: 1.5 G/DL
ALP SERPL-CCNC: 52 U/L (ref 30–99)
ALT SERPL-CCNC: 16 U/L (ref 2–50)
ANION GAP SERPL CALC-SCNC: 15 MMOL/L (ref 7–16)
APPEARANCE UR: CLEAR
AST SERPL-CCNC: 20 U/L (ref 12–45)
B-HCG SERPL-ACNC: 780 MIU/ML (ref 0–5)
BASOPHILS # BLD AUTO: 0.7 % (ref 0–1.8)
BASOPHILS # BLD: 0.08 K/UL (ref 0–0.12)
BILIRUB SERPL-MCNC: 0.6 MG/DL (ref 0.1–1.5)
BILIRUB UR QL STRIP.AUTO: NEGATIVE
BUN SERPL-MCNC: 9 MG/DL (ref 8–22)
CALCIUM ALBUM COR SERPL-MCNC: 8.9 MG/DL (ref 8.5–10.5)
CALCIUM SERPL-MCNC: 9.5 MG/DL (ref 8.5–10.5)
CANDIDA DNA VAG QL PROBE+SIG AMP: NEGATIVE
CHLORIDE SERPL-SCNC: 103 MMOL/L (ref 96–112)
CO2 SERPL-SCNC: 19 MMOL/L (ref 20–33)
COLOR UR: YELLOW
CREAT SERPL-MCNC: 0.7 MG/DL (ref 0.5–1.4)
EOSINOPHIL # BLD AUTO: 0.27 K/UL (ref 0–0.51)
EOSINOPHIL NFR BLD: 2.4 % (ref 0–6.9)
ERYTHROCYTE [DISTWIDTH] IN BLOOD BY AUTOMATED COUNT: 40.2 FL (ref 35.9–50)
G VAGINALIS DNA VAG QL PROBE+SIG AMP: POSITIVE
GFR SERPLBLD CREATININE-BSD FMLA CKD-EPI: 121 ML/MIN/1.73 M 2
GLOBULIN SER CALC-MCNC: 3.1 G/DL (ref 1.9–3.5)
GLUCOSE SERPL-MCNC: 78 MG/DL (ref 65–99)
GLUCOSE UR STRIP.AUTO-MCNC: NEGATIVE MG/DL
HCT VFR BLD AUTO: 40.5 % (ref 37–47)
HGB BLD-MCNC: 13.9 G/DL (ref 12–16)
IMM GRANULOCYTES # BLD AUTO: 0.05 K/UL (ref 0–0.11)
IMM GRANULOCYTES NFR BLD AUTO: 0.4 % (ref 0–0.9)
KETONES UR STRIP.AUTO-MCNC: NEGATIVE MG/DL
LEUKOCYTE ESTERASE UR QL STRIP.AUTO: NEGATIVE
LIPASE SERPL-CCNC: 35 U/L (ref 11–82)
LYMPHOCYTES # BLD AUTO: 3.93 K/UL (ref 1–4.8)
LYMPHOCYTES NFR BLD: 34.4 % (ref 22–41)
MCH RBC QN AUTO: 29.9 PG (ref 27–33)
MCHC RBC AUTO-ENTMCNC: 34.3 G/DL (ref 32.2–35.5)
MCV RBC AUTO: 87.1 FL (ref 81.4–97.8)
MICRO URNS: NORMAL
MONOCYTES # BLD AUTO: 1.07 K/UL (ref 0–0.85)
MONOCYTES NFR BLD AUTO: 9.4 % (ref 0–13.4)
NEUTROPHILS # BLD AUTO: 6.03 K/UL (ref 1.82–7.42)
NEUTROPHILS NFR BLD: 52.7 % (ref 44–72)
NITRITE UR QL STRIP.AUTO: NEGATIVE
NRBC # BLD AUTO: 0 K/UL
NRBC BLD-RTO: 0 /100 WBC (ref 0–0.2)
NUMBER OF RH DOSES IND 8505RD: NORMAL
PH UR STRIP.AUTO: 5.5 [PH] (ref 5–8)
PLATELET # BLD AUTO: 283 K/UL (ref 164–446)
PMV BLD AUTO: 10.9 FL (ref 9–12.9)
POTASSIUM SERPL-SCNC: 3.8 MMOL/L (ref 3.6–5.5)
PROT SERPL-MCNC: 7.8 G/DL (ref 6–8.2)
PROT UR QL STRIP: NEGATIVE MG/DL
RBC # BLD AUTO: 4.65 M/UL (ref 4.2–5.4)
RBC UR QL AUTO: NEGATIVE
RH BLD: NORMAL
SODIUM SERPL-SCNC: 137 MMOL/L (ref 135–145)
SP GR UR STRIP.AUTO: 1.01
T VAGINALIS DNA VAG QL PROBE+SIG AMP: NEGATIVE
UROBILINOGEN UR STRIP.AUTO-MCNC: 0.2 MG/DL
WBC # BLD AUTO: 11.4 K/UL (ref 4.8–10.8)

## 2023-09-07 PROCEDURE — 36415 COLL VENOUS BLD VENIPUNCTURE: CPT

## 2023-09-07 PROCEDURE — 86901 BLOOD TYPING SEROLOGIC RH(D): CPT

## 2023-09-07 PROCEDURE — 80053 COMPREHEN METABOLIC PANEL: CPT

## 2023-09-07 PROCEDURE — 76817 TRANSVAGINAL US OBSTETRIC: CPT

## 2023-09-07 PROCEDURE — 81003 URINALYSIS AUTO W/O SCOPE: CPT

## 2023-09-07 PROCEDURE — 87510 GARDNER VAG DNA DIR PROBE: CPT

## 2023-09-07 PROCEDURE — 83690 ASSAY OF LIPASE: CPT

## 2023-09-07 PROCEDURE — 76801 OB US < 14 WKS SINGLE FETUS: CPT

## 2023-09-07 PROCEDURE — 84702 CHORIONIC GONADOTROPIN TEST: CPT

## 2023-09-07 PROCEDURE — 87491 CHLMYD TRACH DNA AMP PROBE: CPT

## 2023-09-07 PROCEDURE — 87591 N.GONORRHOEAE DNA AMP PROB: CPT

## 2023-09-07 PROCEDURE — 99284 EMERGENCY DEPT VISIT MOD MDM: CPT

## 2023-09-07 PROCEDURE — 85025 COMPLETE CBC W/AUTO DIFF WBC: CPT

## 2023-09-07 PROCEDURE — 87660 TRICHOMONAS VAGIN DIR PROBE: CPT

## 2023-09-07 PROCEDURE — 700105 HCHG RX REV CODE 258: Performed by: STUDENT IN AN ORGANIZED HEALTH CARE EDUCATION/TRAINING PROGRAM

## 2023-09-07 PROCEDURE — 87480 CANDIDA DNA DIR PROBE: CPT

## 2023-09-07 RX ORDER — SODIUM CHLORIDE, SODIUM LACTATE, POTASSIUM CHLORIDE, CALCIUM CHLORIDE 600; 310; 30; 20 MG/100ML; MG/100ML; MG/100ML; MG/100ML
1000 INJECTION, SOLUTION INTRAVENOUS ONCE
Status: COMPLETED | OUTPATIENT
Start: 2023-09-07 | End: 2023-09-07

## 2023-09-07 RX ORDER — METRONIDAZOLE 500 MG/1
250 TABLET ORAL 4 TIMES DAILY
Qty: 14 TABLET | Refills: 0 | Status: ACTIVE | OUTPATIENT
Start: 2023-09-07 | End: 2023-09-14

## 2023-09-07 RX ADMIN — SODIUM CHLORIDE, POTASSIUM CHLORIDE, SODIUM LACTATE AND CALCIUM CHLORIDE 1000 ML: 600; 310; 30; 20 INJECTION, SOLUTION INTRAVENOUS at 20:37

## 2023-09-07 ASSESSMENT — FIBROSIS 4 INDEX: FIB4 SCORE: 0.36

## 2023-09-08 LAB
C TRACH DNA GENITAL QL NAA+PROBE: NEGATIVE
N GONORRHOEA DNA GENITAL QL NAA+PROBE: NEGATIVE
SPECIMEN SOURCE: NORMAL

## 2023-09-08 NOTE — ED NOTES
Patient out in hallway asking for update. Rn updated patient. Chart up for re jl. RN apologized for wait.

## 2023-09-08 NOTE — ED TRIAGE NOTES
"Chief Complaint   Patient presents with    Abdominal Cramping     LLQ cramping pain that began yesterday   Pt was seen at OB yesterday for US which did not show any intrauterine pregnancy   Was told to go to ER if she began to develop pain   Hcg was 515 yesterday   Denies vaginal bleeding      BP (!) 140/93   Pulse 100   Temp 36.9 °C (98.5 °F) (Temporal)   Resp 16   Ht 1.753 m (5' 9\")   Wt 69.2 kg (152 lb 8.9 oz)   SpO2 99%   BMI 22.53 kg/m²     Pt made aware of triage process, placed back into lobby, educated pt to tell staff of any worsening of symptoms     "

## 2023-09-08 NOTE — ED PROVIDER NOTES
ED Provider Note    CHIEF COMPLAINT  Chief Complaint   Patient presents with    Abdominal Cramping     LLQ cramping pain that began yesterday   Pt was seen at OB yesterday for US which did not show any intrauterine pregnancy   Was told to go to ER if she began to develop pain   Hcg was 515 yesterday   Denies vaginal bleeding        EXTERNAL RECORDS REVIEWED  Outpatient Notes urgent care visit, conjunctivitis    HPI/ROS  LIMITATION TO HISTORY   Select: : None  OUTSIDE HISTORIAN(S):  Family mother    Jenni Perez is a 28 y.o. female who presents with left lower quadrant pain which started today,  has become more persistent since 2 PM.  She had a transvaginal ultrasound performed by her OB/GYN Dr. Santana yesterday, which did not show an intrauterine pregnancy, she had an elevated hCG.  Denies any vaginal bleeding.  Reports nausea without vomiting.  Denies urinary symptoms, concern for STI, history of PID, history of ectopic, GI symptoms.    PAST MEDICAL HISTORY   has a past medical history of ADHD, Bipolar 1 disorder (HCC), and Depression.    SURGICAL HISTORY  patient denies any surgical history    FAMILY HISTORY  History reviewed. No pertinent family history.    SOCIAL HISTORY  Social History     Tobacco Use    Smoking status: Never    Smokeless tobacco: Never   Substance and Sexual Activity    Alcohol use: No    Drug use: No    Sexual activity: Yes     Partners: Male       CURRENT MEDICATIONS  Home Medications       Reviewed by Waqar Bella R.N. (Registered Nurse) on 09/07/23 at 1834  Med List Status: Not Addressed     Medication Last Dose Status   docusate sodium 100 MG Cap  Active   ibuprofen (MOTRIN) 600 MG Tab  Active   Prenatal Vit-Fe Fumarate-FA (PRENATAL 1+1 PO)  Active   QUETIAPINE FUMARATE PO  Active   sertraline (ZOLOFT) 50 MG Tab  Active   traZODone (DESYREL) 50 MG Tab  Active   VIORELE 0.15-0.02/0.01 MG (21/5) per tablet  Active                    ALLERGIES  No Known Allergies    PHYSICAL  "EXAM  VITAL SIGNS: /61   Pulse 90   Temp 36.9 °C (98.5 °F) (Temporal)   Resp 15   Ht 1.753 m (5' 9\")   Wt 69.2 kg (152 lb 8.9 oz)   SpO2 98%   BMI 22.53 kg/m²    General: Pleasant young female, nontoxic-appearing, uncomfortable appearing.  Head: Normocephalic atraumatic  Eyes: Extraocular motion intact  Neck: Supple, no rigidity  Cardiovascular: Regular rate and rhythm no murmurs rubs or gallops  Respiratory: Clear to auscultation bilaterally, equal chest rise and fall, no increased work of breathing  Abdomen: Soft left lower quadrant tenderness no guarding  Musculoskeletal: Warm and well perfused, no peripheral edema  Neuro: Alert, no focal deficits  Integumentary: No wounds or rashes  Pelvic: Supervised by RN, thick white discharge, no blood.  Left lower quadrant adnexal tenderness without palpable mass.    DIAGNOSTIC STUDIES / PROCEDURES      LABS    BV positive, mild leukocytosis in the setting of pain, nausea.  hCG 780 today from 500 yesterday, appears to be increasing at appropriate rate  RADIOLOGY    Radiologist interpretation:   US-OB 1ST TRIMESTER WITH TRANSVAGINAL (COMBO)   Final Result         1.  Single Intrauterine gestational sac at 4 weeks, 5 days estimated gestational age.   2.  Right ovarian cyst   3.  Echogenic lesion in the left ovary favoring corpus luteal cyst, otherwise indeterminate.            COURSE & MEDICAL DECISION MAKING    ED Observation Status? Yes; I am placing the patient in to an observation status due to a diagnostic uncertainty as well as therapeutic intensity. Patient placed in observation status at 9/7/2023 8:36 PM     Observation plan is as follows: Labs, ultrasound, pelvic exam, likely discussion with OB/GYN.    Upon Reevaluation, the patient's condition has: Improved; and will be discharged.    Patient discharged from ED Observation status at 11:20 PM September 8, 2023    INITIAL ASSESSMENT, COURSE AND PLAN  Care Narrative: 20-year-old female presenting with left " lower quadrant pain after being diagnosed with a pregnancy of undetermined location yesterday, by her OB/GYN.  Denies vaginal bleeding, discharge, vomiting.  Denies urinary symptoms, GI symptoms.  Vitals notable for initial borderline tachycardia, otherwise unrevealing.  Exam notable for left lower quadrant tenderness, left adnexal tenderness, thick white discharge on pelvic examination.    Differential included was not limited to: Ectopic pregnancy, urine cyst, diverticulitis, urinary tract infection, STI, BV, candidiasis,                DISPOSITION AND DISCUSSIONS  I have discussed management of the patient with the following physicians and LAUREN's: Dr. Morris, agrees patient can follow-up in 2 days, likely intrauterine however not confirmed.         Escalation of care considered, and ultimately not performed:acute inpatient care management, however at this time, the patient is most appropriate for outpatient management    Time of discharge, patient became tearful, reports that she does not want to keep the pregnancy, she was advised to call her OB/GYN tomorrow to discuss options, precautions discussed, patient verbalized understanding agreement plan of care she was prescribed antibiotics for her bacterial vaginosis.  She was provided a lab slip for beta-hCG.      Decision tools and prescription drugs considered including, but not limited to: Antibiotics flagyl .    FINAL DIAGNOSIS  1. Pregnancy, location unknown    2. Bacterial vaginosis           Electronically signed by: Boo Eckert M.D., 9/7/2023 8:36 PM

## 2023-09-08 NOTE — DISCHARGE INSTRUCTIONS
You need to have your hCG level checked in 48 hours. Call Dr. Santana's office in the morning to make an appointment. Take the antibiotics as prescribed for your bacterial infection as this can increase the risks of  labor.  If you develop severe worsening pain, vaginal bleeding, other new symptoms you find concerning return immediately for reevaluation prior to this assessment.